# Patient Record
Sex: FEMALE | Race: BLACK OR AFRICAN AMERICAN | NOT HISPANIC OR LATINO | Employment: UNEMPLOYED | URBAN - METROPOLITAN AREA
[De-identification: names, ages, dates, MRNs, and addresses within clinical notes are randomized per-mention and may not be internally consistent; named-entity substitution may affect disease eponyms.]

---

## 2017-03-22 ENCOUNTER — OFFICE VISIT (OUTPATIENT)
Dept: SURGERY | Facility: CLINIC | Age: 47
End: 2017-03-22

## 2017-03-22 ENCOUNTER — TELEPHONE (OUTPATIENT)
Dept: SURGERY | Facility: CLINIC | Age: 47
End: 2017-03-22

## 2017-03-22 ENCOUNTER — APPOINTMENT (OUTPATIENT)
Dept: LAB | Facility: HOSPITAL | Age: 47
End: 2017-03-22
Attending: SURGERY

## 2017-03-22 VITALS
SYSTOLIC BLOOD PRESSURE: 158 MMHG | BODY MASS INDEX: 50.85 KG/M2 | DIASTOLIC BLOOD PRESSURE: 98 MMHG | RESPIRATION RATE: 16 BRPM | HEIGHT: 60 IN | WEIGHT: 259 LBS | TEMPERATURE: 98.3 F | HEART RATE: 97 BPM | OXYGEN SATURATION: 98 %

## 2017-03-22 DIAGNOSIS — R73.03 PREDIABETES: Primary | ICD-10-CM

## 2017-03-22 DIAGNOSIS — Z83.71 FAMILY HISTORY OF POLYPS IN THE COLON: ICD-10-CM

## 2017-03-22 DIAGNOSIS — Z12.11 COLON CANCER SCREENING: ICD-10-CM

## 2017-03-22 DIAGNOSIS — Z80.0 FAMILY HISTORY OF COLON CANCER REQUIRING SCREENING COLONOSCOPY: ICD-10-CM

## 2017-03-22 PROBLEM — M72.2 PLANTAR FASCIITIS: Status: ACTIVE | Noted: 2017-03-22

## 2017-03-22 LAB — HBA1C MFR BLD: 5.3 % (ref 4.8–5.6)

## 2017-03-22 PROCEDURE — 36415 COLL VENOUS BLD VENIPUNCTURE: CPT | Performed by: SURGERY

## 2017-03-22 PROCEDURE — 83036 HEMOGLOBIN GLYCOSYLATED A1C: CPT | Performed by: SURGERY

## 2017-03-22 PROCEDURE — 99203 OFFICE O/P NEW LOW 30 MIN: CPT | Performed by: SURGERY

## 2017-03-22 RX ORDER — BUTALBITAL, ACETAMINOPHEN AND CAFFEINE 300; 40; 50 MG/1; MG/1; MG/1
CAPSULE ORAL
Refills: 0 | COMMUNITY
Start: 2017-03-15

## 2017-03-22 RX ORDER — TIZANIDINE 4 MG/1
TABLET ORAL
Refills: 5 | COMMUNITY
Start: 2017-02-26 | End: 2017-04-28 | Stop reason: HOSPADM

## 2017-03-22 RX ORDER — SERTRALINE HYDROCHLORIDE 100 MG/1
TABLET, FILM COATED ORAL
Refills: 2 | COMMUNITY
Start: 2017-03-15

## 2017-03-22 NOTE — TELEPHONE ENCOUNTER
Called and spoke with patient regarding her hemoglobin A1c results.  I also advised her that she needed to see her primary care physician ASAP for preoperative medical clearance.

## 2017-03-22 NOTE — PROGRESS NOTES
PATIENT INFORMATION  Alyssa Umana   - 1970    CHIEF COMPLAINT  Chief Complaint   Patient presents with   • Hernia   HERNIA, PT C/O LLQ ABD PAIN, CT DONE AT [facility]  Referral from Dr. Soliman    HISTORY OF PRESENT ILLNESS  HPI  Patient is a 46-year-old -American female BMI 50.6 who was referred for evaluation for left lower quadrant/left sided abdominal pain and hernia.  She reports in 2015 she underwent excision of a large peritoneal cyst by GYN/ ONC at [facility].  The surgery was complicated with what appears to be a wound infection which from what the patient is describing was initially managed with negative pressure wound therapy i.e wound VAC and then switched to wet-to-dry dressings using Dakin's solution.  Patient reports she also saw another surgeon at the wound care center who recommended she continue with the wet-to-dry dressings.  She reports she underwent multiple incision and debridement as well as percutaneous drainage ×2 off what appears to be subcutaneous infected tissue postoperatively.  She reports it occurs 6 months to heal this wound.  She has since last month developed left-sided and left lower quadrant abdominal pain.  She was seen by Dr. Soliman was her primary care physician at the [facility] urgent care and a non-contrasted CT scan was done which showed a large left-sided abdominal wall hernia containing loops of small bowel without any evidence of strangulation/complications.  She has had nausea but denies any vomiting, change in bowel movements melena or hematochezia.  She has had recent weight gain for about 30 pounds.  She attributes that to some diet changes and lack of exercise due to the hernia.  She has a very strong family history of colon cancer and colon polyps.  Her father and brother both had colon cancer and multiple family members have had colon polyps.  She reports her last colonoscopy was in  by Dr. Farley and she thinks she had polyps  but is not completely certain.  She reports she was told to have a repeat colonoscopy in 5 years and is due for it at this time.  Her gastroenterologist has retired.    She also has a strong family history in the females of her family i.e. mother and sister.  Last mammogram 2016-BI-RADS Category 1    In her medical history diabetes mellitus as stated.  When I asked the patient if she had it she reports it was during a hospitalization for pneumonia and sepsis that her blood sugars were elevated.  As far as she is concerned she is not aware of that diagnosis and is not on any hypoglycemic therapy.    REVIEW OF SYSTEMS  Review of Systems   Constitutional: Positive for activity change, appetite change and unexpected weight change.   Respiratory: Negative.    Cardiovascular: Negative.    Gastrointestinal: Positive for abdominal pain and nausea.   Genitourinary: Negative.    Musculoskeletal: Positive for back pain.   Neurological: Negative.    Hematological: Negative.    Psychiatric/Behavioral: Negative.          ACTIVE PROBLEMS  Patient Active Problem List    Diagnosis   • Plantar fasciitis [M72.2]   • Thoracic back pain [M54.6]   • Hypertension [I10]   • Cutaneous fungal infection [B36.9]   • Adiposity [E66.9]         PAST MEDICAL HISTORY  Past Medical History:   Diagnosis Date   • Diabetes mellitus     MEDICALLY INDUCED   • Family history of breast cancer in mother     age 68   • Family history of breast cancer in sister     age 40   • Family history of colon cancer     Father age 69, Brother  age 62   • Hypertension     W PNEUMONIA   • Migraine    • Pneumonia    • Surgical menopause 2015         SURGICAL HISTORY  Past Surgical History:   Procedure Laterality Date   • BILATERAL SALPINGO OOPHORECTOMY  2015    Dr Fuentes Beebe   •  SECTION     •  SECTION PRIMARY     • CHOLECYSTECTOMY     • COLONOSCOPY      Dr. Farley   • DILATATION AND CURETTAGE      Dr Boles  "for bleeding   • EXPLORATORY LAPAROTOMY  04/14/2015    BSO and removal of 12 cm peritoneal inclusion cyst with clear fluid Dr Selwyn Beebe [facility]   • TOTAL ABDOMINAL HYSTERECTOMY  2004    fibroids,anemia, post op wound infection ([place])         FAMILY HISTORY  Family History   Problem Relation Age of Onset   • Hypertension Mother    • Breast cancer Mother 68   • Colon cancer Father 69   • Breast cancer Sister 40   • Colon cancer Brother 62   • No Known Problems Son    • No Known Problems Daughter          SOCIAL HISTORY  Social History     Occupational History   •  [employer]     Social History Main Topics   • Smoking status: Never Smoker   • Smokeless tobacco: Never Used   • Alcohol use Yes      Comment: rarely   • Drug use: No   • Sexual activity: Yes     Partners: Male     Birth control/ protection: None         CURRENT MEDICATIONS    Current Outpatient Prescriptions:   •  ALPRAZolam (XANAX) 0.5 MG tablet, TAKE ONE TABLET THREE TIMES DAILY, Disp: , Rfl: 0  •  amitriptyline (ELAVIL) 50 MG tablet, 1 TABLET BY MOUTH AT BEDTIME FOR SLEEP, Disp: , Rfl: 2  •  butalbital-acetaminophen-caffeine (ORBIVAN) -40 MG capsule capsule, TAKE ONE CAPSULE BY MOUTH EVERY 4 TO 6 HOURS AS NEEDED MAX OF 6 IN 24 HOURS, Disp: , Rfl: 0  •  ibuprofen (ADVIL,MOTRIN) 800 MG tablet, , Disp: , Rfl:   •  oxyCODONE (ROXICODONE) 15 MG immediate release tablet, TAKE 1 TABLET BY MOUTH THREE TIMES A DAY., Disp: , Rfl: 0  •  sertraline (ZOLOFT) 100 MG tablet, TAKE 1 TABLET BY MOUTH IN THE MORNING, Disp: , Rfl: 2  •  tiZANidine (ZANAFLEX) 4 MG tablet, TAKE 1 TABLET BY MOUTH EVERY EIGHT HOURS AS NEEDED FOR MUSCLE SPASM., Disp: , Rfl: 5    ALLERGIES  Penicillins and Naproxen    VITALS  Vitals:    03/22/17 0957   BP: 158/98   Pulse: 97   Resp: 16   Temp: 98.3 °F (36.8 °C)   SpO2: 98%   Weight: 259 lb (117 kg)   Height: 60\" (152.4 cm)       LAST RESULTS   Procedure visit on 09/23/2016   Component Date Value Ref Range Status   • " Color 09/23/2016 Yellow  Yellow, Straw, Dark Yellow, Amanda Final   • Clarity, UA 09/23/2016 Clear  Clear Final   • Glucose, UA 09/23/2016 Negative  Negative mg/dL Final   • Bilirubin 09/23/2016 Negative  Negative Final   • Ketones, UA 09/23/2016 Trace* Negative Final   • Specific Gravity  09/23/2016 1.025  1.005 - 1.030 Final   • Blood, UA 09/23/2016 Trace* Negative Final   • pH, Urine 09/23/2016 6.5  5.0 - 8.0 Final   • Protein, POC 09/23/2016 Negative  Negative mg/dL Final   • Urobilinogen, UA 09/23/2016 Normal  Normal Final   • Leukocytes 09/23/2016 Negative  Negative Final   • Nitrite, UA 09/23/2016 Negative  Negative Final   • HCG, Urine, QL 09/23/2016 Negative  Negative Final   • Lot Number 09/23/2016 [id number]   Final   • Internal Positive Control 09/23/2016 Positive   Final   • Internal Negative Control 09/23/2016 Negative   Final     No results found.    PHYSICAL EXAM  Physical Exam   Constitutional: She is oriented to person, place, and time. She appears well-developed and well-nourished.   She is very anxious    HENT:   Head: Normocephalic and atraumatic.   Eyes: EOM are normal. Pupils are equal, round, and reactive to light. No scleral icterus.   Neck: Neck supple.   Cardiovascular: Normal rate, regular rhythm and normal heart sounds.    Pulmonary/Chest: Breath sounds normal.   Abdominal:   Soft, obese, very large pannus.  There is a well-healed lower midline laparotomy wound.  To the left of this scar is a reducible large ventral/incisional hernia.  It is nontender to palpation.   Lymphadenopathy:     She has no cervical adenopathy.   Neurological: She is alert and oriented to person, place, and time.   Skin: Skin is warm and dry.   Psychiatric: She has a normal mood and affect. Her behavior is normal.   Nursing note and vitals reviewed.      ASSESSMENT  Incisional/ventral hernia  Abdominal pain  Family history of colon cancer and polyps  Family history of breast cancer    I have reviewed her CAT scan  from [facility] urgent care.  I have discussed with her in detail that I feel that it to the in her best interest that we proceed with a component separation.  The procedure, risks, benefits, complications have all been discussed with her in detail.    She is also due for a screening colonoscopy.  I have advised her that we need to proceed with screening colonoscopy prior to hernia repair.  Colonoscopy procedure risks complications including but not limited to risk of bleeding, infection, perforation requiring emergent procedures, post-polypectomy syndrome/bleeding as well as cardiopulmonary complications were thoroughly discussed with the patient who understood and gave verbal informed consent.  We'll schedule screening colonoscopy at [facility].  Bowel prep instructions were provided to her.  She will have to stay off all blood thinners including NSAIDs for at least 5-7 days prior to the procedure.  In the meantime I will obtain records from her previous surgery done in 2015 at [facility] and we'll also try and obtain records of her last colonoscopy.    She will need medical clearance prior to her surgery.  This will be arranged through my office.    We'll check a hemoglobin A1c.  Orders were provided to the patient.      PLAN  Schedule screening colonoscopy  Follow-up after procedure.  Patient was advised to call the office sooner should she have worsening symptoms or go to the nearest emergency room.

## 2017-03-22 NOTE — H&P
PATIENT INFORMATION  Alyssa Umana   - 1970    CHIEF COMPLAINT  Chief Complaint   Patient presents with   • Hernia   HERNIA, PT C/O LLQ ABD PAIN, CT DONE AT [facility]  Referral from Dr. Soliman    HISTORY OF PRESENT ILLNESS  HPI  Patient is a 46-year-old -American female BMI 50.6 who was referred for evaluation for left lower quadrant/left sided abdominal pain and hernia.  She reports in 2015 she underwent excision of a large peritoneal cyst by GYN/ ONC at [facility].  The surgery was complicated with what appears to be a wound infection which from what the patient is describing was initially managed with negative pressure wound therapy i.e wound VAC and then switched to wet-to-dry dressings using Dakin's solution.  Patient reports she also saw another surgeon at the wound care center who recommended she continue with the wet-to-dry dressings.  She reports she underwent multiple incision and debridement as well as percutaneous drainage ×2 off what appears to be subcutaneous infected tissue postoperatively.  She reports it occurs 6 months to heal this wound.  She has since last month developed left-sided and left lower quadrant abdominal pain.  She was seen by Dr. Soliman was her primary care physician at the [facility] urgent [facility] and a non-contrasted CT scan was done which showed a large left-sided abdominal wall hernia containing loops of small bowel without any evidence of strangulation/complications.  She has had nausea but denies any vomiting, change in bowel movements melena or hematochezia.  She has had recent weight gain for about 30 pounds.  She attributes that to some diet changes and lack of exercise due to the hernia.  She has a very strong family history of colon cancer and colon polyps.  Her father and brother both had colon cancer and multiple family members have had colon polyps.  She reports her last colonoscopy was in  by Dr. Farley and she thinks she had polyps  but is not completely certain.  She reports she was told to have a repeat colonoscopy in 5 years and is due for it at this time.  Her gastroenterologist has retired.    She also has a strong family history in the females of her family i.e. mother and sister.  Last mammogram 2016-BI-RADS Category 1    In her medical history diabetes mellitus as stated.  When I asked the patient if she had it she reports it was during a hospitalization for pneumonia and sepsis that her blood sugars were elevated.  As far as she is concerned she is not aware of that diagnosis and is not on any hypoglycemic therapy.    REVIEW OF SYSTEMS  Review of Systems   Constitutional: Positive for activity change, appetite change and unexpected weight change.   Respiratory: Negative.    Cardiovascular: Negative.    Gastrointestinal: Positive for abdominal pain and nausea.   Genitourinary: Negative.    Musculoskeletal: Positive for back pain.   Neurological: Negative.    Hematological: Negative.    Psychiatric/Behavioral: Negative.          ACTIVE PROBLEMS  Patient Active Problem List    Diagnosis   • Plantar fasciitis [M72.2]   • Thoracic back pain [M54.6]   • Hypertension [I10]   • Cutaneous fungal infection [B36.9]   • Adiposity [E66.9]         PAST MEDICAL HISTORY  Past Medical History:   Diagnosis Date   • Diabetes mellitus     MEDICALLY INDUCED   • Family history of breast cancer in mother     age 68   • Family history of breast cancer in sister     age 40   • Family history of colon cancer     Father age 69, Brother  age 62   • Hypertension     W PNEUMONIA   • Migraine    • Pneumonia    • Surgical menopause 2015         SURGICAL HISTORY  Past Surgical History:   Procedure Laterality Date   • BILATERAL SALPINGO OOPHORECTOMY  2015    Dr Fuentes Beebe   •  SECTION     •  SECTION PRIMARY     • CHOLECYSTECTOMY     • COLONOSCOPY      Dr. Farley   • DILATATION AND CURETTAGE      Dr Boles  "for bleeding   • EXPLORATORY LAPAROTOMY  04/14/2015    BSO and removal of 12 cm peritoneal inclusion cyst with clear fluid Dr Selwyn Beebe [facility]   • TOTAL ABDOMINAL HYSTERECTOMY  2004    fibroids,anemia, post op wound infection ([place])         FAMILY HISTORY  Family History   Problem Relation Age of Onset   • Hypertension Mother    • Breast cancer Mother 68   • Colon cancer Father 69   • Breast cancer Sister 40   • Colon cancer Brother 62   • No Known Problems Son    • No Known Problems Daughter          SOCIAL HISTORY  Social History     Occupational History   •  [employer]     Social History Main Topics   • Smoking status: Never Smoker   • Smokeless tobacco: Never Used   • Alcohol use Yes      Comment: rarely   • Drug use: No   • Sexual activity: Yes     Partners: Male     Birth control/ protection: None         CURRENT MEDICATIONS    Current Outpatient Prescriptions:   •  ALPRAZolam (XANAX) 0.5 MG tablet, TAKE ONE TABLET THREE TIMES DAILY, Disp: , Rfl: 0  •  amitriptyline (ELAVIL) 50 MG tablet, 1 TABLET BY MOUTH AT BEDTIME FOR SLEEP, Disp: , Rfl: 2  •  butalbital-acetaminophen-caffeine (ORBIVAN) -40 MG capsule capsule, TAKE ONE CAPSULE BY MOUTH EVERY 4 TO 6 HOURS AS NEEDED MAX OF 6 IN 24 HOURS, Disp: , Rfl: 0  •  ibuprofen (ADVIL,MOTRIN) 800 MG tablet, , Disp: , Rfl:   •  oxyCODONE (ROXICODONE) 15 MG immediate release tablet, TAKE 1 TABLET BY MOUTH THREE TIMES A DAY., Disp: , Rfl: 0  •  sertraline (ZOLOFT) 100 MG tablet, TAKE 1 TABLET BY MOUTH IN THE MORNING, Disp: , Rfl: 2  •  tiZANidine (ZANAFLEX) 4 MG tablet, TAKE 1 TABLET BY MOUTH EVERY EIGHT HOURS AS NEEDED FOR MUSCLE SPASM., Disp: , Rfl: 5    ALLERGIES  Penicillins and Naproxen    VITALS  Vitals:    03/22/17 0957   BP: 158/98   Pulse: 97   Resp: 16   Temp: 98.3 °F (36.8 °C)   SpO2: 98%   Weight: 259 lb (117 kg)   Height: 60\" (152.4 cm)       LAST RESULTS   Procedure visit on 09/23/2016   Component Date Value Ref Range Status   • " Color 09/23/2016 Yellow  Yellow, Straw, Dark Yellow, Amanda Final   • Clarity, UA 09/23/2016 Clear  Clear Final   • Glucose, UA 09/23/2016 Negative  Negative mg/dL Final   • Bilirubin 09/23/2016 Negative  Negative Final   • Ketones, UA 09/23/2016 Trace* Negative Final   • Specific Gravity  09/23/2016 1.025  1.005 - 1.030 Final   • Blood, UA 09/23/2016 Trace* Negative Final   • pH, Urine 09/23/2016 6.5  5.0 - 8.0 Final   • Protein, POC 09/23/2016 Negative  Negative mg/dL Final   • Urobilinogen, UA 09/23/2016 Normal  Normal Final   • Leukocytes 09/23/2016 Negative  Negative Final   • Nitrite, UA 09/23/2016 Negative  Negative Final   • HCG, Urine, QL 09/23/2016 Negative  Negative Final   • Lot Number 09/23/2016 [id number]   Final   • Internal Positive Control 09/23/2016 Positive   Final   • Internal Negative Control 09/23/2016 Negative   Final     No results found.    PHYSICAL EXAM  Physical Exam   Constitutional: She is oriented to person, place, and time. She appears well-developed and well-nourished.   She is very anxious    HENT:   Head: Normocephalic and atraumatic.   Eyes: EOM are normal. Pupils are equal, round, and reactive to light. No scleral icterus.   Neck: Neck supple.   Cardiovascular: Normal rate, regular rhythm and normal heart sounds.    Pulmonary/Chest: Breath sounds normal.   Abdominal:   Soft, obese, very large pannus.  There is a well-healed lower midline laparotomy wound.  To the left of this scar is a reducible large ventral/incisional hernia.  It is nontender to palpation.   Lymphadenopathy:     She has no cervical adenopathy.   Neurological: She is alert and oriented to person, place, and time.   Skin: Skin is warm and dry.   Psychiatric: She has a normal mood and affect. Her behavior is normal.   Nursing note and vitals reviewed.      ASSESSMENT  Incisional/ventral hernia  Abdominal pain  Family history of colon cancer and polyps  Family history of breast cancer    I have reviewed her CAT scan  from [facility] urgent care.  I have discussed with her in detail that I feel that it to the in her best interest that we proceed with a component separation.  The procedure, risks, benefits, complications have all been discussed with her in detail.    She is also due for a screening colonoscopy.  I have advised her that we need to proceed with screening colonoscopy prior to hernia repair.  Colonoscopy procedure risks complications including but not limited to risk of bleeding, infection, perforation requiring emergent procedures, post-polypectomy syndrome/bleeding as well as cardiopulmonary complications were thoroughly discussed with the patient who understood and gave verbal informed consent.  We'll schedule screening colonoscopy at [facility].  Bowel prep instructions were provided to her.  She will have to stay off all blood thinners including NSAIDs for at least 5-7 days prior to the procedure.  In the meantime I will obtain records from her previous surgery done in 2015 at [facility] and we'll also try and obtain records of her last colonoscopy.    She will need medical clearance prior to her surgery.  This will be arranged through my office.    We'll check a hemoglobin A1c.  Orders were provided to the patient.      PLAN  Schedule screening colonoscopy  Follow-up after procedure.  Patient was advised to call the office sooner should she have worsening symptoms or go to the nearest emergency room.

## 2017-04-10 ENCOUNTER — ANESTHESIA EVENT (OUTPATIENT)
Dept: PERIOP | Facility: HOSPITAL | Age: 47
End: 2017-04-10

## 2017-04-11 ENCOUNTER — ANESTHESIA (OUTPATIENT)
Dept: PERIOP | Facility: HOSPITAL | Age: 47
End: 2017-04-11

## 2017-04-11 ENCOUNTER — HOSPITAL ENCOUNTER (OUTPATIENT)
Facility: HOSPITAL | Age: 47
Setting detail: HOSPITAL OUTPATIENT SURGERY
Discharge: HOME | End: 2017-04-11
Attending: SURGERY | Admitting: SURGERY

## 2017-04-11 VITALS
BODY MASS INDEX: 48.71 KG/M2 | DIASTOLIC BLOOD PRESSURE: 103 MMHG | TEMPERATURE: 97.8 F | SYSTOLIC BLOOD PRESSURE: 154 MMHG | RESPIRATION RATE: 12 BRPM | WEIGHT: 249.4 LBS | OXYGEN SATURATION: 98 % | HEART RATE: 68 BPM

## 2017-04-11 PROBLEM — Z80.0 FAMILY HISTORY OF COLON CANCER REQUIRING SCREENING COLONOSCOPY: Status: ACTIVE | Noted: 2017-04-11

## 2017-04-11 PROBLEM — Z12.11 COLON CANCER SCREENING: Status: ACTIVE | Noted: 2017-04-11

## 2017-04-11 PROBLEM — Z83.71 FAMILY HISTORY OF POLYPS IN THE COLON: Status: ACTIVE | Noted: 2017-04-11

## 2017-04-11 LAB — GLUCOSE BLDC GLUCOMTR-MCNC: 91 MG/DL (ref 70–130)

## 2017-04-11 PROCEDURE — 82962 GLUCOSE BLOOD TEST: CPT

## 2017-04-11 PROCEDURE — 25010000002 PROPOFOL 10 MG/ML EMULSION: Performed by: NURSE ANESTHETIST, CERTIFIED REGISTERED

## 2017-04-11 PROCEDURE — 45378 DIAGNOSTIC COLONOSCOPY: CPT | Performed by: SURGERY

## 2017-04-11 RX ORDER — LIDOCAINE HYDROCHLORIDE 20 MG/ML
INJECTION, SOLUTION INFILTRATION; PERINEURAL AS NEEDED
Status: DISCONTINUED | OUTPATIENT
Start: 2017-04-11 | End: 2017-04-11 | Stop reason: SURG

## 2017-04-11 RX ORDER — PROPOFOL 10 MG/ML
VIAL (ML) INTRAVENOUS AS NEEDED
Status: DISCONTINUED | OUTPATIENT
Start: 2017-04-11 | End: 2017-04-11 | Stop reason: SURG

## 2017-04-11 RX ORDER — SODIUM CHLORIDE 0.9 % (FLUSH) 0.9 %
1-10 SYRINGE (ML) INJECTION AS NEEDED
Status: DISCONTINUED | OUTPATIENT
Start: 2017-04-11 | End: 2017-04-11 | Stop reason: HOSPADM

## 2017-04-11 RX ORDER — MAGNESIUM HYDROXIDE 1200 MG/15ML
LIQUID ORAL AS NEEDED
Status: DISCONTINUED | OUTPATIENT
Start: 2017-04-11 | End: 2017-04-11 | Stop reason: HOSPADM

## 2017-04-11 RX ORDER — GLYCOPYRROLATE 0.2 MG/ML
INJECTION INTRAMUSCULAR; INTRAVENOUS AS NEEDED
Status: DISCONTINUED | OUTPATIENT
Start: 2017-04-11 | End: 2017-04-11 | Stop reason: SURG

## 2017-04-11 RX ORDER — SODIUM CHLORIDE, SODIUM LACTATE, POTASSIUM CHLORIDE, CALCIUM CHLORIDE 600; 310; 30; 20 MG/100ML; MG/100ML; MG/100ML; MG/100ML
9 INJECTION, SOLUTION INTRAVENOUS CONTINUOUS
Status: DISCONTINUED | OUTPATIENT
Start: 2017-04-11 | End: 2017-04-11 | Stop reason: HOSPADM

## 2017-04-11 RX ORDER — PROPOFOL 10 MG/ML
VIAL (ML) INTRAVENOUS CONTINUOUS PRN
Status: DISCONTINUED | OUTPATIENT
Start: 2017-04-11 | End: 2017-04-11 | Stop reason: SURG

## 2017-04-11 RX ORDER — LIDOCAINE HYDROCHLORIDE 10 MG/ML
INJECTION, SOLUTION EPIDURAL; INFILTRATION; INTRACAUDAL; PERINEURAL
Status: DISCONTINUED
Start: 2017-04-11 | End: 2017-04-11 | Stop reason: HOSPADM

## 2017-04-11 RX ORDER — LIDOCAINE HYDROCHLORIDE 10 MG/ML
0.3 INJECTION, SOLUTION EPIDURAL; INFILTRATION; INTRACAUDAL; PERINEURAL ONCE
Status: DISCONTINUED | OUTPATIENT
Start: 2017-04-11 | End: 2017-04-11 | Stop reason: HOSPADM

## 2017-04-11 RX ADMIN — PROPOFOL 50 MG: 10 INJECTION, EMULSION INTRAVENOUS at 09:07

## 2017-04-11 RX ADMIN — LIDOCAINE HYDROCHLORIDE 100 MG: 20 INJECTION, SOLUTION INFILTRATION; PERINEURAL at 09:04

## 2017-04-11 RX ADMIN — SODIUM CHLORIDE, POTASSIUM CHLORIDE, SODIUM LACTATE AND CALCIUM CHLORIDE 9 ML/HR: 600; 310; 30; 20 INJECTION, SOLUTION INTRAVENOUS at 08:30

## 2017-04-11 RX ADMIN — PROPOFOL 50 MG: 10 INJECTION, EMULSION INTRAVENOUS at 09:04

## 2017-04-11 RX ADMIN — GLYCOPYRROLATE 0.1 MG: 0.2 INJECTION INTRAMUSCULAR; INTRAVENOUS at 09:00

## 2017-04-11 RX ADMIN — PROPOFOL 100 MCG/KG/MIN: 10 INJECTION, EMULSION INTRAVENOUS at 09:04

## 2017-04-11 NOTE — PLAN OF CARE
Problem: Patient Care Overview (Adult)  Goal: Plan of Care Review  Outcome: Ongoing (interventions implemented as appropriate)    04/11/17 1000   Coping/Psychosocial Response Interventions   Plan Of Care Reviewed With patient;daughter   Patient Care Overview   Progress no change   Outcome Evaluation   Outcome Summary/Follow up Plan VSS, awaiting discharge       Goal: Adult Individualization and Mutuality  Outcome: Ongoing (interventions implemented as appropriate)    04/11/17 1315   Mutuality/Individual Preferences   What Anxieties, Fears or Concerns Do You Have About Your Health or Care? Denies         Problem: GI Endoscopy (Adult)  Goal: Signs and Symptoms of Listed Potential Problems Will be Absent or Manageable (GI Endoscopy)  Outcome: Ongoing (interventions implemented as appropriate)    04/11/17 1000   GI Endoscopy   Problems Assessed (GI Endoscopy) all   Problems Present (GI Endoscopy) none

## 2017-04-11 NOTE — PLAN OF CARE
Problem: Patient Care Overview (Adult)  Goal: Plan of Care Review  Outcome: Ongoing (interventions implemented as appropriate)    04/11/17 0844   Coping/Psychosocial Response Interventions   Plan Of Care Reviewed With patient;daughter   Patient Care Overview   Progress no change   Outcome Evaluation   Outcome Summary/Follow up Plan VSS, NO C/O, READY FOR PROCEDURE       Goal: Adult Individualization and Mutuality  Outcome: Ongoing (interventions implemented as appropriate)    Problem: GI Endoscopy (Adult)  Goal: Signs and Symptoms of Listed Potential Problems Will be Absent or Manageable (GI Endoscopy)  Outcome: Ongoing (interventions implemented as appropriate)

## 2017-04-11 NOTE — H&P (VIEW-ONLY)
PATIENT INFORMATION  Alyssa Umana   - 1970    CHIEF COMPLAINT  Chief Complaint   Patient presents with   • Hernia   HERNIA, PT C/O LLQ ABD PAIN, CT DONE AT [facility] URG[facility]  Referral from Dr. Soliman    HISTORY OF PRESENT ILLNESS  HPI  Patient is a 46-year-old -American female BMI 50.6 who was referred for evaluation for left lower quadrant/left sided abdominal pain and hernia.  She reports in 2015 she underwent excision of a large peritoneal cyst by GYN/ ONC at [facility].  The surgery was complicated with what appears to be a wound infection which from what the patient is describing was initially managed with negative pressure wound therapy i.e wound VAC and then switched to wet-to-dry dressings using Dakin's solution.  Patient reports she also saw another surgeon at the wound care center who recommended she continue with the wet-to-dry dressings.  She reports she underwent multiple incision and debridement as well as percutaneous drainage ×2 off what appears to be subcutaneous infected tissue postoperatively.  She reports it occurs 6 months to heal this wound.  She has since last month developed left-sided and left lower quadrant abdominal pain.  She was seen by Dr. Soliman was her primary care physician at the [facility] urgent care and a non-contrasted CT scan was done which showed a large left-sided abdominal wall hernia containing loops of small bowel without any evidence of strangulation/complications.  She has had nausea but denies any vomiting, change in bowel movements melena or hematochezia.  She has had recent weight gain for about 30 pounds.  She attributes that to some diet changes and lack of exercise due to the hernia.  She has a very strong family history of colon cancer and colon polyps.  Her father and brother both had colon cancer and multiple family members have had colon polyps.  She reports her last colonoscopy was in  by Dr. Farley and she thinks she had polyps  but is not completely certain.  She reports she was told to have a repeat colonoscopy in 5 years and is due for it at this time.  Her gastroenterologist has retired.    She also has a strong family history in the females of her family i.e. mother and sister.  Last mammogram 2016-BI-RADS Category 1    In her medical history diabetes mellitus as stated.  When I asked the patient if she had it she reports it was during a hospitalization for pneumonia and sepsis that her blood sugars were elevated.  As far as she is concerned she is not aware of that diagnosis and is not on any hypoglycemic therapy.    REVIEW OF SYSTEMS  Review of Systems   Constitutional: Positive for activity change, appetite change and unexpected weight change.   Respiratory: Negative.    Cardiovascular: Negative.    Gastrointestinal: Positive for abdominal pain and nausea.   Genitourinary: Negative.    Musculoskeletal: Positive for back pain.   Neurological: Negative.    Hematological: Negative.    Psychiatric/Behavioral: Negative.          ACTIVE PROBLEMS  Patient Active Problem List    Diagnosis   • Plantar fasciitis [M72.2]   • Thoracic back pain [M54.6]   • Hypertension [I10]   • Cutaneous fungal infection [B36.9]   • Adiposity [E66.9]         PAST MEDICAL HISTORY  Past Medical History:   Diagnosis Date   • Diabetes mellitus     MEDICALLY INDUCED   • Family history of breast cancer in mother     age 68   • Family history of breast cancer in sister     age 40   • Family history of colon cancer     Father age 69, Brother  age 62   • Hypertension     W PNEUMONIA   • Migraine    • Pneumonia    • Surgical menopause 2015         SURGICAL HISTORY  Past Surgical History:   Procedure Laterality Date   • BILATERAL SALPINGO OOPHORECTOMY  2015    Dr Fuentes Beebe   •  SECTION     •  SECTION PRIMARY     • CHOLECYSTECTOMY     • COLONOSCOPY      Dr. Farley   • DILATATION AND CURETTAGE      Dr Boles  "for bleeding   • EXPLORATORY LAPAROTOMY  04/14/2015    BSO and removal of 12 cm peritoneal inclusion cyst with clear fluid Dr Selwyn Beebe [facility]   • TOTAL ABDOMINAL HYSTERECTOMY  2004    fibroids,anemia, post op wound infection ([place])         FAMILY HISTORY  Family History   Problem Relation Age of Onset   • Hypertension Mother    • Breast cancer Mother 68   • Colon cancer Father 69   • Breast cancer Sister 40   • Colon cancer Brother 62   • No Known Problems Son    • No Known Problems Daughter          SOCIAL HISTORY  Social History     Occupational History   •  [employer]     Social History Main Topics   • Smoking status: Never Smoker   • Smokeless tobacco: Never Used   • Alcohol use Yes      Comment: rarely   • Drug use: No   • Sexual activity: Yes     Partners: Male     Birth control/ protection: None         CURRENT MEDICATIONS    Current Outpatient Prescriptions:   •  ALPRAZolam (XANAX) 0.5 MG tablet, TAKE ONE TABLET THREE TIMES DAILY, Disp: , Rfl: 0  •  amitriptyline (ELAVIL) 50 MG tablet, 1 TABLET BY MOUTH AT BEDTIME FOR SLEEP, Disp: , Rfl: 2  •  butalbital-acetaminophen-caffeine (ORBIVAN) -40 MG capsule capsule, TAKE ONE CAPSULE BY MOUTH EVERY 4 TO 6 HOURS AS NEEDED MAX OF 6 IN 24 HOURS, Disp: , Rfl: 0  •  ibuprofen (ADVIL,MOTRIN) 800 MG tablet, , Disp: , Rfl:   •  oxyCODONE (ROXICODONE) 15 MG immediate release tablet, TAKE 1 TABLET BY MOUTH THREE TIMES A DAY., Disp: , Rfl: 0  •  sertraline (ZOLOFT) 100 MG tablet, TAKE 1 TABLET BY MOUTH IN THE MORNING, Disp: , Rfl: 2  •  tiZANidine (ZANAFLEX) 4 MG tablet, TAKE 1 TABLET BY MOUTH EVERY EIGHT HOURS AS NEEDED FOR MUSCLE SPASM., Disp: , Rfl: 5    ALLERGIES  Penicillins and Naproxen    VITALS  Vitals:    03/22/17 0957   BP: 158/98   Pulse: 97   Resp: 16   Temp: 98.3 °F (36.8 °C)   SpO2: 98%   Weight: 259 lb (117 kg)   Height: 60\" (152.4 cm)       LAST RESULTS   Procedure visit on 09/23/2016   Component Date Value Ref Range Status   • " Color 09/23/2016 Yellow  Yellow, Straw, Dark Yellow, Amanda Final   • Clarity, UA 09/23/2016 Clear  Clear Final   • Glucose, UA 09/23/2016 Negative  Negative mg/dL Final   • Bilirubin 09/23/2016 Negative  Negative Final   • Ketones, UA 09/23/2016 Trace* Negative Final   • Specific Gravity  09/23/2016 1.025  1.005 - 1.030 Final   • Blood, UA 09/23/2016 Trace* Negative Final   • pH, Urine 09/23/2016 6.5  5.0 - 8.0 Final   • Protein, POC 09/23/2016 Negative  Negative mg/dL Final   • Urobilinogen, UA 09/23/2016 Normal  Normal Final   • Leukocytes 09/23/2016 Negative  Negative Final   • Nitrite, UA 09/23/2016 Negative  Negative Final   • HCG, Urine, QL 09/23/2016 Negative  Negative Final   • Lot Number 09/23/2016 [id number]   Final   • Internal Positive Control 09/23/2016 Positive   Final   • Internal Negative Control 09/23/2016 Negative   Final     No results found.    PHYSICAL EXAM  Physical Exam   Constitutional: She is oriented to person, place, and time. She appears well-developed and well-nourished.   She is very anxious    HENT:   Head: Normocephalic and atraumatic.   Eyes: EOM are normal. Pupils are equal, round, and reactive to light. No scleral icterus.   Neck: Neck supple.   Cardiovascular: Normal rate, regular rhythm and normal heart sounds.    Pulmonary/Chest: Breath sounds normal.   Abdominal:   Soft, obese, very large pannus.  There is a well-healed lower midline laparotomy wound.  To the left of this scar is a reducible large ventral/incisional hernia.  It is nontender to palpation.   Lymphadenopathy:     She has no cervical adenopathy.   Neurological: She is alert and oriented to person, place, and time.   Skin: Skin is warm and dry.   Psychiatric: She has a normal mood and affect. Her behavior is normal.   Nursing note and vitals reviewed.      ASSESSMENT  Incisional/ventral hernia  Abdominal pain  Family history of colon cancer and polyps  Family history of breast cancer    I have reviewed her CAT scan  from [facility] urgent care.  I have discussed with her in detail that I feel that it to the in her best interest that we proceed with a component separation.  The procedure, risks, benefits, complications have all been discussed with her in detail.    She is also due for a screening colonoscopy.  I have advised her that we need to proceed with screening colonoscopy prior to hernia repair.  Colonoscopy procedure risks complications including but not limited to risk of bleeding, infection, perforation requiring emergent procedures, post-polypectomy syndrome/bleeding as well as cardiopulmonary complications were thoroughly discussed with the patient who understood and gave verbal informed consent.  We'll schedule screening colonoscopy at [facility].  Bowel prep instructions were provided to her.  She will have to stay off all blood thinners including NSAIDs for at least 5-7 days prior to the procedure.  In the meantime I will obtain records from her previous surgery done in 2015 at [facility] and we'll also try and obtain records of her last colonoscopy.    She will need medical clearance prior to her surgery.  This will be arranged through my office.    We'll check a hemoglobin A1c.  Orders were provided to the patient.      PLAN  Schedule screening colonoscopy  Follow-up after procedure.  Patient was advised to call the office sooner should she have worsening symptoms or go to the nearest emergency room.

## 2017-04-11 NOTE — ANESTHESIA PREPROCEDURE EVALUATION
Anesthesia Evaluation     Patient summary reviewed and Nursing notes reviewed   no history of anesthetic complications:  NPO Status: > 8 hours   Airway   Mallampati: III  TM distance: >3 FB  Neck ROM: full  possible difficult intubation  Dental - normal exam     Pulmonary - normal exam    breath sounds clear to auscultation  (+) pneumonia resolved ,   Cardiovascular - normal exam    Rhythm: regular  Rate: normal    (+) hypertension poorly controlled,     ROS comment: EKG @ Dr Soliman office 4/10/17, ok per pt    Neuro/Psych  (+) headaches,    GI/Hepatic/Renal/Endo    (+) morbid obesity, diabetes mellitus (not Rx in 10 yrs. accu check 91) type 2,     Musculoskeletal     (+) chronic pain,   Abdominal   (+) obese,    Substance History - negative use     OB/GYN          Other   (+) arthritis                                 Anesthesia Plan    ASA 3     MAC     Anesthetic plan and risks discussed with patient.  Use of blood products discussed with patient  Consented to blood products.

## 2017-04-11 NOTE — ANESTHESIA POSTPROCEDURE EVALUATION
Patient: Alyssa Umana    Procedure Summary     Date Anesthesia Start Anesthesia Stop Room / Location    04/11/17 0859 0937 [facility] /  [facility]       Procedure Diagnosis Surgeon Provider    COLONOSCOPY (N/A ) Diverticulosis; Internal hemorrhoids  (Family history of polyps in the colon [Z83.71]; Colon cancer screening [Z12.11]; Family history of colon cancer requiring screening colonoscopy [Z80.0]) MD Michelle Rockwell CRNA          Anesthesia Type: MAC  Last vitals  BP (!) 160/103 (04/11/17 0941)    Temp 97.8 °F (36.6 °C) (04/11/17 0941)    Pulse 72 (04/11/17 0941)   Resp 10 (04/11/17 0941)    SpO2 100 % (04/11/17 0941)      Post Anesthesia Care and Evaluation    Patient location during evaluation: PHASE II  Patient participation: complete - patient participated  Level of consciousness: awake and alert  Pain score: 0  Pain management: adequate  Airway patency: patent  Anesthetic complications: No anesthetic complications  PONV Status: none  Cardiovascular status: acceptable  Respiratory status: acceptable  Hydration status: acceptable

## 2017-04-11 NOTE — OP NOTE
Colonoscopy Procedure Note  Date of procedure: 4/11/17    Pre-operative Diagnosis:  Family history of colon cancer requiring screening colonoscopy           Personal history of colon polyps    Post-operative Diagnosis: Inverted sigmoid colon diverticulum            Internal hemorrhoids            Poor prep    Procedure: Colonoscopy      Surgeon: Leslie Majano M.D.    Anesthetic: MAC per Maribell Noonan CRNA    EBL : None    Complications : None    Indications:  Patient is a 46-year-old -American morbidly obese female with BMI 50.6 referred to general surgery for abdominal pain and screening colonoscopy.  She has a family history of colon cancer and colon polyps. Her last colonoscopy was in 2012 she has a personal history of colon polyps.  She was told to have a follow-up for  surveillance/screening in 5 years.  We have been unable to get those records.  She has had left lower quadrant and left-sided abdominal pain. Denies any change in bowel movements melena or hematochezia.  She has had a recent weight gain of 30 pounds.  As part of workup she was advised to undergo screening colonoscopy.  Procedure, risks, benefits, complications including but not limited to risk of bleeding, perforation requiring emergent additional procedures, post-polypectomy syndrome/bleeding as well as cardiopulmonary complications were thoroughly discussed with the patient understood and gave informed consent.    Findings/Treatments: Colon preparation was poor specially in the right colon with there was a lot of thick liquid and solid stool that had to be irrigated and suctioned out at several instances.  This was certainly increase the likelihood of missing a diminutive lesion.  To the best visualization and no gross mucosal abnormalities or polyps noted.  A solitary inverted diverticulum noted in the sigmoid colon with no associated complications.  Large internal hemorrhoids with no associated complications.    Scope Withdrawal Time:   Greater than 6 minutes      Recommendations: Educational materials on high fiber diet and diverticulosis provided.  Based on her personal history of colon polyps, poor colon preparation and family history of colon cancer I would recommend a repeat colonoscopy in one year with a 2 day prep.    Procedure Details     After discussing the benefits and risks of the procedure with the patient, not limited to but including:  Bleeding, infection, perforation, aspiration; informed consent was signed.  The patient was taken into the endoscopy room at [facility] and placed in the left lateral decubitus position.  MAC anesthesia was given with appropriate cardiopulmonary monitoring.  A rectal exam was performed.  Sphincter tone was normal.  The colonoscope was then inserted and carefully advanced to the cecum while visualizing the mucosa.  The colon preparation was very poor.  There was thick liquid and solid stool in the right colon particularly.  I had to irrigate and suction out this could certainly increase the likelihood of missing a diminutive lesion.  The cecum was identified by the anatomic landmarks i.e. the cecal strap, ileocecal valve and the orifice of the appendix.  Scope was then gradually withdrawn carefully evaluating the colon mucosa in a circumferential fashion with findings as follows: Cecum, ileocecal valve, appendiceal orifice, ascending colon, hepatic flexure, transverse colon, splenic flexure, descending colon no gross pathology noted but visualization was suboptimal due to poor prep.  Scope was brought back into the sigmoid colon where diverticulosis was noted along with an inverted diverticulum.  No associated complications.  Scope was brought back into the rectum.  Retroflex examination performed small internal hemorrhoids with no associated complications.  Scope was then straightened out and withdrawn while desufflating the colon.    Patient was awakened, her anesthesia reversed and she  was taken to recovery room in stable condition having tolerated her procedure well with no immediate apparent complications.      Leslie Majano MD

## 2017-04-17 ENCOUNTER — OFFICE VISIT (OUTPATIENT)
Dept: SURGERY | Facility: CLINIC | Age: 47
End: 2017-04-17

## 2017-04-17 VITALS
OXYGEN SATURATION: 99 % | BODY MASS INDEX: 48.88 KG/M2 | HEART RATE: 96 BPM | DIASTOLIC BLOOD PRESSURE: 100 MMHG | SYSTOLIC BLOOD PRESSURE: 154 MMHG | HEIGHT: 60 IN | WEIGHT: 249 LBS | TEMPERATURE: 98.2 F

## 2017-04-17 DIAGNOSIS — K43.2 INCISIONAL HERNIA, WITHOUT OBSTRUCTION OR GANGRENE: Primary | ICD-10-CM

## 2017-04-17 DIAGNOSIS — K43.9 VENTRAL HERNIA WITHOUT OBSTRUCTION OR GANGRENE: ICD-10-CM

## 2017-04-17 PROCEDURE — 99213 OFFICE O/P EST LOW 20 MIN: CPT | Performed by: SURGERY

## 2017-04-17 RX ORDER — CLINDAMYCIN PHOSPHATE 900 MG/50ML
900 INJECTION INTRAVENOUS ONCE
Status: CANCELLED | OUTPATIENT
Start: 2017-04-17 | End: 2017-04-17

## 2017-04-17 NOTE — H&P
PATIENT INFORMATION  Alyssa Umana   - 1970    CHIEF COMPLAINT  Chief Complaint   Patient presents with   • Hernia   Follow-up after colonoscopy  Here to discuss hernia surgery    HISTORY OF PRESENT ILLNESS  HPI  Patient presents the office today for follow-up to discuss her colonoscopy results and discuss her future hernia surgery.  She reports she is overall doing well.  Denies any nausea vomiting change in bowel movements melena or hematochezia.  She does complain of pain associated with her left lower quadrant abdominal wall hernia.  She does not notice any further exacerbation of her existent pain.    Colonoscopy findings were discussed with the patient.  In light of her poor prep and strong family history of colon cancer I have advised her that we need to have repeat colonoscopy done in 1 year with a 2 day prep.  She is agreeable to that.    As far as the left lower quadrant abdominal wall hernia is concerned --I have reviewed the images personally and discussed the case as well as her CT scan with my associate Dr. Cordova.  She has a large left sided abdominal wall incisional hernia, with a loop of bowel contained within the hernia sac within a complicating features i.e. bowel obstruction or strangulation.    As far as past surgical history is concerned-She reports in 2015 she underwent excision of a large peritoneal cyst by GYN/ ONC at [facility]. The surgery was complicated with what appears to be a wound infection which from what the patient is describing was initially managed with negative pressure wound therapy i.e wound VAC and then switched to wet-to-dry dressings using Dakin's solution. Patient reports she also saw another surgeon at the wound care center who recommended she continue with the wet-to-dry dressings. She reports she underwent multiple incision and debridement as well as percutaneous drainage ×2 off what appears to be subcutaneous infected tissue  postoperatively. She reports it occurs 6 months to heal this wound.  I do not have any records available as to what organisms were cultured from her wound infections.  Will contact [facility] to obtain those records.        REVIEW OF SYSTEMS  Review of Systems   Constitutional: Positive for activity change and unexpected weight change.   Respiratory: Negative.    Cardiovascular: Negative.    Gastrointestinal: Positive for abdominal pain.   Psychiatric/Behavioral: The patient is nervous/anxious.          ACTIVE PROBLEMS  Patient Active Problem List    Diagnosis   • Colon cancer screening [Z12.11]   • Family history of colon cancer requiring screening colonoscopy [Z80.0]   • Family history of polyps in the colon [Z83.71]   • Plantar fasciitis [M72.2]   • Thoracic back pain [M54.6]   • Hypertension [I10]   • Cutaneous fungal infection [B36.9]   • Adiposity [E66.9]         PAST MEDICAL HISTORY  Past Medical History:   Diagnosis Date   • Diabetes mellitus     MEDICALLY INDUCED   • Family history of breast cancer in mother     age 68   • Family history of breast cancer in sister     age 40   • Family history of colon cancer     Father age 69, Brother  age 62   • Hypertension     W PNEUMONIA   • Migraine    • Pneumonia    • Surgical menopause 2015         SURGICAL HISTORY  Past Surgical History:   Procedure Laterality Date   • BILATERAL SALPINGO OOPHORECTOMY  2015    Dr Fuentes Beebe   •  SECTION     •  SECTION PRIMARY     • CHOLECYSTECTOMY     • COLONOSCOPY      Dr. Farley   • COLONOSCOPY N/A 2017    Procedure: COLONOSCOPY;  Surgeon: Leslie Majano MD;  Location: [facility];  Service:    • DILATATION AND CURETTAGE      Dr Boles for bleeding   • EXPLORATORY LAPAROTOMY  2015    BSO and removal of 12 cm peritoneal inclusion cyst with clear fluid Dr Selwyn Beebe [facility]   • TOTAL ABDOMINAL HYSTERECTOMY  2004    fibroids,anemia, post op wound  "infection ([place])         FAMILY HISTORY  Family History   Problem Relation Age of Onset   • Hypertension Mother    • Breast cancer Mother 68   • Colon cancer Father 69   • Breast cancer Sister 40   • Colon cancer Brother 62   • No Known Problems Son    • No Known Problems Daughter          SOCIAL HISTORY  Social History     Occupational History   •  [employer]     Social History Main Topics   • Smoking status: Never Smoker   • Smokeless tobacco: Never Used   • Alcohol use Yes      Comment: rarely   • Drug use: No   • Sexual activity: Yes     Partners: Male     Birth control/ protection: None         CURRENT MEDICATIONS    Current Outpatient Prescriptions:   •  ALPRAZolam (XANAX) 0.5 MG tablet, TAKE ONE TABLET THREE TIMES DAILY, Disp: , Rfl: 0  •  amitriptyline (ELAVIL) 50 MG tablet, 1 TABLET BY MOUTH AT BEDTIME FOR SLEEP, Disp: , Rfl: 2  •  butalbital-acetaminophen-caffeine (ORBIVAN) -40 MG capsule capsule, TAKE ONE CAPSULE BY MOUTH EVERY 4 TO 6 HOURS AS NEEDED MAX OF 6 IN 24 HOURS, Disp: , Rfl: 0  •  ibuprofen (ADVIL,MOTRIN) 800 MG tablet, , Disp: , Rfl:   •  oxyCODONE (ROXICODONE) 15 MG immediate release tablet, TAKE 1 TABLET BY MOUTH THREE TIMES A DAY., Disp: , Rfl: 0  •  sertraline (ZOLOFT) 100 MG tablet, TAKE 1 TABLET BY MOUTH IN THE MORNING, Disp: , Rfl: 2  •  tiZANidine (ZANAFLEX) 4 MG tablet, TAKE 1 TABLET BY MOUTH EVERY EIGHT HOURS AS NEEDED FOR MUSCLE SPASM., Disp: , Rfl: 5    ALLERGIES  Penicillins and Naproxen    VITALS  Vitals:    04/17/17 0947   BP: 154/100   Pulse: 96   Temp: 98.2 °F (36.8 °C)   TempSrc: Oral   SpO2: 99%   Weight: 249 lb (113 kg)   Height: 60\" (152.4 cm)       LAST RESULTS   Admission on 04/11/2017, Discharged on 04/11/2017   Component Date Value Ref Range Status   • Glucose 04/11/2017 91  70 - 130 mg/dL Final     No results found.    PHYSICAL EXAM  Physical Exam   Constitutional: She is oriented to person, place, and time. She appears well-developed and " well-nourished.   Cardiovascular: Normal rate, regular rhythm and normal heart sounds.    Pulmonary/Chest: Breath sounds normal.   Abdominal:   Soft, obese, very large pannus.  There is a well-healed lower midline laparotomy wound.  To the left of this scar is a reducible large ventral/incisional hernia.  It is nontender to palpation.      Neurological: She is alert and oriented to person, place, and time.   Skin: Skin is warm and dry.   Nursing note and vitals reviewed.      ASSESSMENT  Incisional/ventral hernia  Abdominal pain     I have had a lengthy discussion with the patient -- surgical versus nonsurgical options discussed. Pros and cons / risks and benefits discussed.  I have also discussed the case with my associate Dr. Cordova.  And we feel that she will be a good candidate for a ventral incisional hernia repair with separation of components and advancement of myofascial flaps.  We have discussed the procedure, risks, benefits, complications including but not limited to risk of bleeding, infection, risk of hematoma formation, seroma formation, risk of flap ischemia/necrosis, risk of recurrence, risk of infections associated with mesh or mesh migration as well as cardiopulmonary complications have thoroughly been discussed with the patient who understands and wants to proceed.    Surgery will be scheduled at [facility].  She will undergo preadmission testing prior to that.  She will have to stay off blood thinners for at least 5-7 days prior to the procedure.    Colonoscopy with poor prep  Family history of colon cancer and polyps. Have recommended to the patient repeat colonoscopy in one year with 2 day prep.  We'll place on one year recall list.    We will try and get records from [facility]     PLAN  Follow-up after surgery.  Patient was advised to call the office sooner should she have worsening symptoms or go to the nearest emergency room.

## 2017-04-17 NOTE — PROGRESS NOTES
PATIENT INFORMATION  Alyssa Umana   - 1970    CHIEF COMPLAINT  Chief Complaint   Patient presents with   • Hernia   Follow-up after colonoscopy  Here to discuss hernia surgery    HISTORY OF PRESENT ILLNESS  HPI  Patient presents the office today for follow-up to discuss her colonoscopy results and discuss her future hernia surgery.  She reports she is overall doing well.  Denies any nausea vomiting change in bowel movements melena or hematochezia.  She does complain of pain associated with her left lower quadrant abdominal wall hernia.  She does not notice any further exacerbation of her existent pain.    Colonoscopy findings were discussed with the patient.  In light of her poor prep and strong family history of colon cancer I have advised her that we need to have repeat colonoscopy done in 1 year with a 2 day prep.  She is agreeable to that.    As far as the left lower quadrant abdominal wall hernia is concerned --I have reviewed the images personally and discussed the case as well as her CT scan with my associate Dr. Cordova.  She has a large left sided abdominal wall incisional hernia, with a loop of bowel contained within the hernia sac within a complicating features i.e. bowel obstruction or strangulation.    As far as past surgical history is concerned-She reports in 2015 she underwent excision of a large peritoneal cyst by GYN/ ONC at [facility]. The surgery was complicated with what appears to be a wound infection which from what the patient is describing was initially managed with negative pressure wound therapy i.e wound VAC and then switched to wet-to-dry dressings using Dakin's solution. Patient reports she also saw another surgeon at the wound care center who recommended she continue with the wet-to-dry dressings. She reports she underwent multiple incision and debridement as well as percutaneous drainage ×2 off what appears to be subcutaneous infected tissue  postoperatively. She reports it occurs 6 months to heal this wound.  I do not have any records available as to what organisms were cultured from her wound infections.  Will contact [facility] to obtain those records.        REVIEW OF SYSTEMS  Review of Systems   Constitutional: Positive for activity change and unexpected weight change.   Respiratory: Negative.    Cardiovascular: Negative.    Gastrointestinal: Positive for abdominal pain.   Psychiatric/Behavioral: The patient is nervous/anxious.          ACTIVE PROBLEMS  Patient Active Problem List    Diagnosis   • Colon cancer screening [Z12.11]   • Family history of colon cancer requiring screening colonoscopy [Z80.0]   • Family history of polyps in the colon [Z83.71]   • Plantar fasciitis [M72.2]   • Thoracic back pain [M54.6]   • Hypertension [I10]   • Cutaneous fungal infection [B36.9]   • Adiposity [E66.9]         PAST MEDICAL HISTORY  Past Medical History:   Diagnosis Date   • Diabetes mellitus     MEDICALLY INDUCED   • Family history of breast cancer in mother     age 68   • Family history of breast cancer in sister     age 40   • Family history of colon cancer     Father age 69, Brother  age 62   • Hypertension     W PNEUMONIA   • Migraine    • Pneumonia    • Surgical menopause 2015         SURGICAL HISTORY  Past Surgical History:   Procedure Laterality Date   • BILATERAL SALPINGO OOPHORECTOMY  2015    Dr Fuentes Beebe   •  SECTION     •  SECTION PRIMARY     • CHOLECYSTECTOMY     • COLONOSCOPY      Dr. Farley   • COLONOSCOPY N/A 2017    Procedure: COLONOSCOPY;  Surgeon: Leslie Majano MD;  Location: [facility];  Service:    • DILATATION AND CURETTAGE      Dr Boles for bleeding   • EXPLORATORY LAPAROTOMY  2015    BSO and removal of 12 cm peritoneal inclusion cyst with clear fluid Dr Selwyn Beebe [facility]   • TOTAL ABDOMINAL HYSTERECTOMY  2004    fibroids,anemia, post op wound  "infection ([place])         FAMILY HISTORY  Family History   Problem Relation Age of Onset   • Hypertension Mother    • Breast cancer Mother 68   • Colon cancer Father 69   • Breast cancer Sister 40   • Colon cancer Brother 62   • No Known Problems Son    • No Known Problems Daughter          SOCIAL HISTORY  Social History     Occupational History   •  [employer]     Social History Main Topics   • Smoking status: Never Smoker   • Smokeless tobacco: Never Used   • Alcohol use Yes      Comment: rarely   • Drug use: No   • Sexual activity: Yes     Partners: Male     Birth control/ protection: None         CURRENT MEDICATIONS    Current Outpatient Prescriptions:   •  ALPRAZolam (XANAX) 0.5 MG tablet, TAKE ONE TABLET THREE TIMES DAILY, Disp: , Rfl: 0  •  amitriptyline (ELAVIL) 50 MG tablet, 1 TABLET BY MOUTH AT BEDTIME FOR SLEEP, Disp: , Rfl: 2  •  butalbital-acetaminophen-caffeine (ORBIVAN) -40 MG capsule capsule, TAKE ONE CAPSULE BY MOUTH EVERY 4 TO 6 HOURS AS NEEDED MAX OF 6 IN 24 HOURS, Disp: , Rfl: 0  •  ibuprofen (ADVIL,MOTRIN) 800 MG tablet, , Disp: , Rfl:   •  oxyCODONE (ROXICODONE) 15 MG immediate release tablet, TAKE 1 TABLET BY MOUTH THREE TIMES A DAY., Disp: , Rfl: 0  •  sertraline (ZOLOFT) 100 MG tablet, TAKE 1 TABLET BY MOUTH IN THE MORNING, Disp: , Rfl: 2  •  tiZANidine (ZANAFLEX) 4 MG tablet, TAKE 1 TABLET BY MOUTH EVERY EIGHT HOURS AS NEEDED FOR MUSCLE SPASM., Disp: , Rfl: 5    ALLERGIES  Penicillins and Naproxen    VITALS  Vitals:    04/17/17 0947   BP: 154/100   Pulse: 96   Temp: 98.2 °F (36.8 °C)   TempSrc: Oral   SpO2: 99%   Weight: 249 lb (113 kg)   Height: 60\" (152.4 cm)       LAST RESULTS   Admission on 04/11/2017, Discharged on 04/11/2017   Component Date Value Ref Range Status   • Glucose 04/11/2017 91  70 - 130 mg/dL Final     No results found.    PHYSICAL EXAM  Physical Exam   Constitutional: She is oriented to person, place, and time. She appears well-developed and " well-nourished.   Cardiovascular: Normal rate, regular rhythm and normal heart sounds.    Pulmonary/Chest: Breath sounds normal.   Abdominal:   Soft, obese, very large pannus.  There is a well-healed lower midline laparotomy wound.  To the left of this scar is a reducible large ventral/incisional hernia.  It is nontender to palpation.      Neurological: She is alert and oriented to person, place, and time.   Skin: Skin is warm and dry.   Nursing note and vitals reviewed.      ASSESSMENT  Incisional/ventral hernia  Abdominal pain     I have had a lengthy discussion with the patient -- surgical versus nonsurgical options discussed. Pros and cons / risks and benefits discussed.  I have also discussed the case with my associate Dr. Cordova.  And we feel that she will be a good candidate for a ventral incisional hernia repair with separation of components and advancement of myofascial flaps.  We have discussed the procedure, risks, benefits, complications including but not limited to risk of bleeding, infection, risk of hematoma formation, seroma formation, risk of flap ischemia/necrosis, risk of recurrence, risk of infections associated with mesh or mesh migration as well as cardiopulmonary complications have thoroughly been discussed with the patient who understands and wants to proceed.    Surgery will be scheduled at [facility].  She will undergo preadmission testing prior to that.  She will have to stay off blood thinners for at least 5-7 days prior to the procedure.    Colonoscopy with poor prep  Family history of colon cancer and polyps. Have recommended to the patient repeat colonoscopy in one year with 2 day prep.  We'll place on one year recall list.    We will try and get records from [facility]     PLAN  Follow-up after surgery.  Patient was advised to call the office sooner should she have worsening symptoms or go to the nearest emergency room.

## 2017-04-18 ENCOUNTER — APPOINTMENT (OUTPATIENT)
Dept: CT IMAGING | Facility: HOSPITAL | Age: 47
End: 2017-04-18

## 2017-04-18 ENCOUNTER — ANESTHESIA EVENT (OUTPATIENT)
Dept: PERIOP | Facility: HOSPITAL | Age: 47
End: 2017-04-18

## 2017-04-18 ENCOUNTER — HOSPITAL ENCOUNTER (EMERGENCY)
Facility: HOSPITAL | Age: 47
Discharge: HOME | End: 2017-04-18
Attending: EMERGENCY MEDICINE | Admitting: EMERGENCY MEDICINE

## 2017-04-18 VITALS
DIASTOLIC BLOOD PRESSURE: 93 MMHG | HEIGHT: 60 IN | WEIGHT: 246 LBS | RESPIRATION RATE: 18 BRPM | BODY MASS INDEX: 48.29 KG/M2 | TEMPERATURE: 98.2 F | OXYGEN SATURATION: 99 % | HEART RATE: 81 BPM | SYSTOLIC BLOOD PRESSURE: 142 MMHG

## 2017-04-18 DIAGNOSIS — K43.9 ABDOMINAL WALL HERNIA: Primary | ICD-10-CM

## 2017-04-18 LAB
ALBUMIN SERPL-MCNC: 3.8 G/DL (ref 3.5–5.2)
ALBUMIN/GLOB SERPL: 1.4 G/DL
ALP SERPL-CCNC: 113 U/L (ref 40–129)
ALT SERPL W P-5'-P-CCNC: 86 U/L (ref 5–33)
ANION GAP SERPL CALCULATED.3IONS-SCNC: 10.9 MMOL/L
AST SERPL-CCNC: 37 U/L (ref 5–32)
BASOPHILS # BLD AUTO: 0.02 10*3/MM3 (ref 0–0.2)
BASOPHILS NFR BLD AUTO: 0.4 % (ref 0–2)
BILIRUB SERPL-MCNC: 0.2 MG/DL (ref 0.2–1.2)
BILIRUB UR QL STRIP: NEGATIVE
BUN BLD-MCNC: 15 MG/DL (ref 6–20)
BUN/CREAT SERPL: 17.4 (ref 7–25)
CALCIUM SPEC-SCNC: 8.9 MG/DL (ref 8.6–10.5)
CHLORIDE SERPL-SCNC: 99 MMOL/L (ref 98–107)
CLARITY UR: ABNORMAL
CO2 SERPL-SCNC: 29.1 MMOL/L (ref 22–29)
COLOR UR: YELLOW
CREAT BLD-MCNC: 0.86 MG/DL (ref 0.57–1)
D-LACTATE SERPL-SCNC: 1.6 MMOL/L (ref 0.5–2)
DEPRECATED RDW RBC AUTO: 42.8 FL (ref 37–54)
EOSINOPHIL # BLD AUTO: 0.07 10*3/MM3 (ref 0.1–0.3)
EOSINOPHIL NFR BLD AUTO: 1.4 % (ref 0–4)
ERYTHROCYTE [DISTWIDTH] IN BLOOD BY AUTOMATED COUNT: 13.6 % (ref 11.5–14.5)
GFR SERPL CREATININE-BSD FRML MDRD: 86 ML/MIN/1.73
GLOBULIN UR ELPH-MCNC: 2.7 GM/DL
GLUCOSE BLD-MCNC: 124 MG/DL (ref 65–99)
GLUCOSE UR STRIP-MCNC: NEGATIVE MG/DL
HCT VFR BLD AUTO: 39.4 % (ref 37–47)
HGB BLD-MCNC: 13.1 G/DL (ref 12–16)
HGB UR QL STRIP.AUTO: NEGATIVE
IMM GRANULOCYTES # BLD: 0.01 10*3/MM3 (ref 0–0.03)
IMM GRANULOCYTES NFR BLD: 0.2 % (ref 0–0.5)
KETONES UR QL STRIP: NEGATIVE
LEUKOCYTE ESTERASE UR QL STRIP.AUTO: NEGATIVE
LYMPHOCYTES # BLD AUTO: 1.63 10*3/MM3 (ref 0.6–4.8)
LYMPHOCYTES NFR BLD AUTO: 32.8 % (ref 20–45)
MCH RBC QN AUTO: 28.5 PG (ref 27–31)
MCHC RBC AUTO-ENTMCNC: 33.2 G/DL (ref 31–37)
MCV RBC AUTO: 85.7 FL (ref 81–99)
MONOCYTES # BLD AUTO: 0.5 10*3/MM3 (ref 0–1)
MONOCYTES NFR BLD AUTO: 10.1 % (ref 3–8)
NEUTROPHILS # BLD AUTO: 2.74 10*3/MM3 (ref 1.5–8.3)
NEUTROPHILS NFR BLD AUTO: 55.1 % (ref 45–70)
NITRITE UR QL STRIP: NEGATIVE
NRBC BLD MANUAL-RTO: 0 /100 WBC (ref 0–0)
PH UR STRIP.AUTO: 6 [PH] (ref 4.5–8)
PLATELET # BLD AUTO: 244 10*3/MM3 (ref 140–500)
PMV BLD AUTO: 9.1 FL (ref 7.4–10.4)
POTASSIUM BLD-SCNC: 3.2 MMOL/L (ref 3.5–5.2)
PROT SERPL-MCNC: 6.5 G/DL (ref 6–8.5)
PROT UR QL STRIP: NEGATIVE
RBC # BLD AUTO: 4.6 10*6/MM3 (ref 4.2–5.4)
SODIUM BLD-SCNC: 139 MMOL/L (ref 136–145)
SP GR UR STRIP: 1.01 (ref 1–1.03)
UROBILINOGEN UR QL STRIP: ABNORMAL
WBC NRBC COR # BLD: 4.97 10*3/MM3 (ref 4.8–10.8)

## 2017-04-18 PROCEDURE — 96374 THER/PROPH/DIAG INJ IV PUSH: CPT

## 2017-04-18 PROCEDURE — 96361 HYDRATE IV INFUSION ADD-ON: CPT

## 2017-04-18 PROCEDURE — 96376 TX/PRO/DX INJ SAME DRUG ADON: CPT

## 2017-04-18 PROCEDURE — 83605 ASSAY OF LACTIC ACID: CPT | Performed by: EMERGENCY MEDICINE

## 2017-04-18 PROCEDURE — 96375 TX/PRO/DX INJ NEW DRUG ADDON: CPT

## 2017-04-18 PROCEDURE — 99284 EMERGENCY DEPT VISIT MOD MDM: CPT | Performed by: EMERGENCY MEDICINE

## 2017-04-18 PROCEDURE — 25010000002 HYDROMORPHONE PER 4 MG: Performed by: EMERGENCY MEDICINE

## 2017-04-18 PROCEDURE — 25010000002 ONDANSETRON PER 1 MG: Performed by: EMERGENCY MEDICINE

## 2017-04-18 PROCEDURE — 85025 COMPLETE CBC W/AUTO DIFF WBC: CPT | Performed by: EMERGENCY MEDICINE

## 2017-04-18 PROCEDURE — 99283 EMERGENCY DEPT VISIT LOW MDM: CPT

## 2017-04-18 PROCEDURE — 25010000002 PROMETHAZINE PER 50 MG: Performed by: EMERGENCY MEDICINE

## 2017-04-18 PROCEDURE — 81003 URINALYSIS AUTO W/O SCOPE: CPT | Performed by: EMERGENCY MEDICINE

## 2017-04-18 PROCEDURE — 80053 COMPREHEN METABOLIC PANEL: CPT | Performed by: EMERGENCY MEDICINE

## 2017-04-18 PROCEDURE — 0 IOPAMIDOL PER 1 ML: Performed by: EMERGENCY MEDICINE

## 2017-04-18 PROCEDURE — 74177 CT ABD & PELVIS W/CONTRAST: CPT

## 2017-04-18 RX ORDER — PROMETHAZINE HYDROCHLORIDE 25 MG/1
25 TABLET ORAL EVERY 6 HOURS PRN
Qty: 10 TABLET | Refills: 0 | Status: SHIPPED | OUTPATIENT
Start: 2017-04-18 | End: 2017-04-18 | Stop reason: HOSPADM

## 2017-04-18 RX ORDER — PROMETHAZINE HYDROCHLORIDE 25 MG/1
25 TABLET ORAL EVERY 6 HOURS PRN
Qty: 10 TABLET | Refills: 0 | Status: SHIPPED | OUTPATIENT
Start: 2017-04-18 | End: 2017-04-28 | Stop reason: HOSPADM

## 2017-04-18 RX ORDER — AMITRIPTYLINE HYDROCHLORIDE 100 MG/1
100 TABLET, FILM COATED ORAL NIGHTLY
COMMUNITY

## 2017-04-18 RX ORDER — HYDROCODONE BITARTRATE AND ACETAMINOPHEN 5; 325 MG/1; MG/1
1 TABLET ORAL EVERY 4 HOURS PRN
Qty: 30 TABLET | Refills: 0 | Status: SHIPPED | OUTPATIENT
Start: 2017-04-18 | End: 2017-04-18 | Stop reason: HOSPADM

## 2017-04-18 RX ORDER — ONDANSETRON 2 MG/ML
4 INJECTION INTRAMUSCULAR; INTRAVENOUS ONCE
Status: COMPLETED | OUTPATIENT
Start: 2017-04-18 | End: 2017-04-18

## 2017-04-18 RX ORDER — ALPRAZOLAM 0.25 MG/1
0.25 TABLET ORAL 3 TIMES DAILY PRN
COMMUNITY

## 2017-04-18 RX ORDER — PROMETHAZINE HYDROCHLORIDE 25 MG/ML
12.5 INJECTION, SOLUTION INTRAMUSCULAR; INTRAVENOUS ONCE
Status: COMPLETED | OUTPATIENT
Start: 2017-04-18 | End: 2017-04-18

## 2017-04-18 RX ADMIN — IOPAMIDOL 100 ML: 755 INJECTION, SOLUTION INTRAVENOUS at 20:35

## 2017-04-18 RX ADMIN — ONDANSETRON 4 MG: 2 INJECTION, SOLUTION INTRAMUSCULAR; INTRAVENOUS at 18:11

## 2017-04-18 RX ADMIN — PROMETHAZINE HYDROCHLORIDE 12.5 MG: 25 INJECTION INTRAMUSCULAR; INTRAVENOUS at 19:09

## 2017-04-18 RX ADMIN — HYDROMORPHONE HYDROCHLORIDE 1 MG: 1 INJECTION, SOLUTION INTRAMUSCULAR; INTRAVENOUS; SUBCUTANEOUS at 18:11

## 2017-04-18 RX ADMIN — ONDANSETRON 4 MG: 2 INJECTION, SOLUTION INTRAMUSCULAR; INTRAVENOUS at 18:36

## 2017-04-18 RX ADMIN — SODIUM CHLORIDE 1000 ML: 900 INJECTION, SOLUTION INTRAVENOUS at 18:11

## 2017-04-18 NOTE — ED PROVIDER NOTES
Subjective   History of Present Illness  History of Present Illness    Chief complaint: Abdominal pain    Location: Side of abdomen    Quality/Severity:  Moderate, sharp    Timing/Onset/Duration: Onset over the last few days, worse today    Modifying Factors: Movement makes it worse, remaining still makes it better    Associated Symptoms: No headache.  No fever chills or cough.  No sore throat earache or nasal congestion.  No chest pain or shortness breath.  The patient has nausea but no vomiting.  There is no diarrhea or burning when she urinates.    Narrative: This 46-year-old -American female presents with left lower abdominal pain that got worse today around 1:30 after she woke up from a nap.  The patient is scheduled to have repair of an abdominal wall hernia by Dr. Majano on April 24, 2017.  The patient denies any fever or chills.  The patient has nausea but no vomiting.  The pain is sharp.  It is worse with movement, better if she remains still.  She denies any diarrhea or burning when she urinates.  The patient hasn't states she has not been passing gas today.  She had a small bowel movement yesterday after taking an enema.  The patient called Dr. Majano who sent her to the emergency department today for an abdominal CT.  A shunt was seen in Dr. Majano's office yesterday and the patient states that Dr. Majano could reduce the hernia.    PCP:  Janny    Surgeon:  Gretel        Review of Systems   Constitutional: Negative for chills and fever.   HENT: Negative for congestion, ear pain and sore throat.    Eyes: Negative for pain and discharge.   Respiratory: Negative for cough, chest tightness, shortness of breath and wheezing.    Cardiovascular: Negative for chest pain, palpitations and leg swelling.   Gastrointestinal: Positive for abdominal pain, constipation and nausea. Negative for blood in stool, diarrhea and vomiting.   Genitourinary: Negative for decreased urine volume, dysuria, flank pain, frequency,  genital sores, hematuria, menstrual problem, pelvic pain, urgency, vaginal bleeding, vaginal discharge and vaginal pain.   Musculoskeletal: Negative for back pain.   Skin: Negative for rash.   Neurological: Negative for weakness and headaches.   Hematological: Negative for adenopathy.   Psychiatric/Behavioral: Negative for confusion.        Medication List      ASK your doctor about these medications          * ALPRAZolam 0.5 MG tablet   Commonly known as:  XANAX       * ALPRAZolam 0.25 MG tablet   Commonly known as:  XANAX       * amitriptyline 50 MG tablet   Commonly known as:  ELAVIL       * amitriptyline 100 MG tablet   Commonly known as:  ELAVIL       butalbital-acetaminophen-caffeine -40 MG capsule capsule   Commonly known as:  ORBIVAN       ibuprofen 800 MG tablet   Commonly known as:  ADVIL,MOTRIN       oxyCODONE 15 MG immediate release tablet   Commonly known as:  ROXICODONE       sertraline 100 MG tablet   Commonly known as:  ZOLOFT       tiZANidine 4 MG tablet   Commonly known as:  ZANAFLEX       * Notice:  This list has 4 medication(s) that are the same as other   medications prescribed for you. Read the directions carefully, and ask   your doctor or other care provider to review them with you.        Past Medical History:   Diagnosis Date   • Diabetes mellitus     MEDICALLY INDUCED   • Family history of breast cancer in mother     age 68   • Family history of breast cancer in sister     age 40   • Family history of colon cancer     Father age 69, Brother  age 62   • Hypertension     W PNEUMONIA   • Migraine    • Pneumonia    • Surgical menopause 2015       Allergies   Allergen Reactions   • Penicillins Rash   • Naproxen      Severe stomach cramps       Past Surgical History:   Procedure Laterality Date   • BILATERAL SALPINGO OOPHORECTOMY  2015    Dr Fuentes Beebe   •  SECTION     •  SECTION PRIMARY     • CHOLECYSTECTOMY     • COLONOSCOPY        Martine   • COLONOSCOPY N/A 4/11/2017    Procedure: COLONOSCOPY;  Surgeon: Leslie Majano MD;  Location: [facility];  Service:    • DILATATION AND CURETTAGE  1989    Dr Boles for bleeding   • EXPLORATORY LAPAROTOMY  04/14/2015    BSO and removal of 12 cm peritoneal inclusion cyst with clear fluid Dr Selwyn Beebe [facility]   • TOTAL ABDOMINAL HYSTERECTOMY  2004    fibroids,anemia, post op wound infection ([place])       Family History   Problem Relation Age of Onset   • Hypertension Mother    • Breast cancer Mother 68   • Colon cancer Father 69   • Breast cancer Sister 40   • Colon cancer Brother 62   • No Known Problems Son    • No Known Problems Daughter        Social History     Social History   • Marital status:      Spouse name: N/A   • Number of children: 2   • Years of education: N/A     Occupational History   •  [employer]     Social History Main Topics   • Smoking status: Never Smoker   • Smokeless tobacco: Never Used   • Alcohol use Yes      Comment: rarely   • Drug use: No   • Sexual activity: Yes     Partners: Male     Birth control/ protection: None     Other Topics Concern   • None     Social History Narrative    Ob/gyn patient since 12/20/10.           Objective   Physical Exam   Constitutional: She is oriented to person, place, and time. She appears well-developed and well-nourished. No distress.   ED Triage Vitals:  Temp: 98.2 °F (36.8 °C) (04/18/17 1650)  Heart Rate: 99 (04/18/17 1650)  Resp: 20 (04/18/17 1650)  BP: 151/109 (04/18/17 1650)  SpO2: 99 % (04/18/17 1650)  Temp src: Oral (04/18/17 1650)  Heart Rate Source: n/a  Patient Position: Sitting (04/18/17 1650)  BP Location: Right arm (04/18/17 1650)  FiO2 (%): n/a    The patient's vitals were reviewed by me.  Unless otherwise noted they are within normal limits.  The patient is hypertensive with a blood pressure 151/109.  She has a moderate amount of pain.     HENT:   Head: Normocephalic and atraumatic.   Right Ear:  External ear normal.   Left Ear: External ear normal.   Nose: Nose normal.   Mouth/Throat: Oropharynx is clear and moist.   Eyes: Conjunctivae and EOM are normal. Pupils are equal, round, and reactive to light. Right eye exhibits no discharge. Left eye exhibits no discharge. No scleral icterus.   Neck: Normal range of motion. Neck supple. No JVD present. No tracheal deviation present. No thyromegaly present.   Cardiovascular: Normal rate, regular rhythm, normal heart sounds and intact distal pulses.  Exam reveals no gallop and no friction rub.    No murmur heard.  Pulmonary/Chest: Effort normal and breath sounds normal. No stridor. No respiratory distress. She has no wheezes. She has no rales. She exhibits no tenderness.   Abdominal: Soft. Bowel sounds are normal. She exhibits no distension and no mass. There is tenderness. There is no rebound and no guarding. A hernia (left lower quadrant it does appear to reduce.) is present.   Musculoskeletal: Normal range of motion. She exhibits no edema or deformity.   Lymphadenopathy:     She has no cervical adenopathy.   Neurological: She is alert and oriented to person, place, and time.   Skin: Skin is warm and dry. No rash noted. She is not diaphoretic. No erythema. No pallor.   Psychiatric: Her behavior is normal.   Nursing note and vitals reviewed.      Procedures         ED Course  ED Course   Comment By Time   The laboratory values were reviewed by me.  The glucose is 124.  The potassium 3.2.  CO2 is 29.1.  A LT is 86, the AST is 37.  The laboratory values are otherwise unremarkable. Hector Lee MD 04/18 1934   The CT the abdomen as read by  shows nothing acute.  There is an area inferior abdominal wall hernia containing small bowel loops with no complicating features.  The entrance to the hernia sac measures 7 cm. Hector Lee MD 04/18 2127                  [facility]  CT Abdomen Pelvis With Contrast    (Results Pending)     Labs Reviewed   COMPREHENSIVE  METABOLIC PANEL - Abnormal; Notable for the following:        Result Value    Glucose 124 (*)     Potassium 3.2 (*)     CO2 29.1 (*)     ALT (SGPT) 86 (*)     AST (SGOT) 37 (*)     All other components within normal limits   URINALYSIS W/ CULTURE IF INDICATED - Abnormal; Notable for the following:     Appearance, UA Turbid (*)     All other components within normal limits    Narrative:     Urine microscopic not indicated.   CBC WITH AUTO DIFFERENTIAL - Abnormal; Notable for the following:     Monocyte % 10.1 (*)     Eosinophils, Absolute 0.07 (*)     All other components within normal limits   LACTIC ACID, PLASMA - Normal   CBC AND DIFFERENTIAL    Narrative:     The following orders were created for panel order CBC & Differential.  Procedure                               Abnormality         Status                     ---------                               -----------         ------                     CBC Auto Differential[[id number]]         Abnormal            Final result                 Please view results for these tests on the individual orders.     10:02 PM, 04/18/17:  The patient was reassessed.  She feels better.  Her vital signs were reviewed and are stable.  Abdominal exam: Soft nontender no masses positive bowel sounds.    10:02 PM, 04/18/17:  Dr. Majano evaluated the patient in the emergency department and reviewed the CT.  The plan will be to send the patient home on pain medication and nausea medication.  The patient should call Dr. Majano if she worsens.  Patient should return to emergency department if she has increasing pain, vomiting, vomiting blood, fever, chills, worse in any way at all.    10:11 PM, 04/18/17:  The [facility] report was reviewed by me.  The report number is [id number].    Final diagnoses:   None         ED Medications:  Medications   HYDROmorphone (DILAUDID) injection 1 mg (1 mg Intravenous Given 4/18/17 1811)   ondansetron (ZOFRAN) injection 4 mg (4 mg Intravenous Given 4/18/17 1811)    sodium chloride 0.9 % bolus 1,000 mL (0 mL Intravenous Stopped 4/18/17 1945)   ondansetron (ZOFRAN) injection 4 mg (4 mg Intravenous Given 4/18/17 1836)   promethazine (PHENERGAN) injection 12.5 mg (12.5 mg Intravenous Given 4/18/17 1909)   iopamidol (ISOVUE-370) 76 % injection 100 mL (100 mL Intravenous Given 4/18/17 2035)       New Medications:     Medication List      ASK your doctor about these medications          * ALPRAZolam 0.5 MG tablet   Commonly known as:  XANAX       * ALPRAZolam 0.25 MG tablet   Commonly known as:  XANAX       * amitriptyline 50 MG tablet   Commonly known as:  ELAVIL       * amitriptyline 100 MG tablet   Commonly known as:  ELAVIL       butalbital-acetaminophen-caffeine -40 MG capsule capsule   Commonly known as:  ORBIVAN       ibuprofen 800 MG tablet   Commonly known as:  ADVIL,MOTRIN       oxyCODONE 15 MG immediate release tablet   Commonly known as:  ROXICODONE       sertraline 100 MG tablet   Commonly known as:  ZOLOFT       tiZANidine 4 MG tablet   Commonly known as:  ZANAFLEX       * Notice:  This list has 4 medication(s) that are the same as other   medications prescribed for you. Read the directions carefully, and ask   your doctor or other care provider to review them with you.        Stopped Medications:     Medication List      ASK your doctor about these medications          * ALPRAZolam 0.5 MG tablet   Commonly known as:  XANAX       * ALPRAZolam 0.25 MG tablet   Commonly known as:  XANAX       * amitriptyline 50 MG tablet   Commonly known as:  ELAVIL       * amitriptyline 100 MG tablet   Commonly known as:  ELAVIL       butalbital-acetaminophen-caffeine -40 MG capsule capsule   Commonly known as:  ORBIVAN       ibuprofen 800 MG tablet   Commonly known as:  ADVIL,MOTRIN       oxyCODONE 15 MG immediate release tablet   Commonly known as:  ROXICODONE       sertraline 100 MG tablet   Commonly known as:  ZOLOFT       tiZANidine 4 MG tablet   Commonly known as:   ZANAFLEX       * Notice:  This list has 4 medication(s) that are the same as other   medications prescribed for you. Read the directions carefully, and ask   your doctor or other care provider to review them with you.          Final diagnoses:   Abdominal wall hernia            Hector Lee MD  04/18/17 2205       Hector Lee MD  04/18/17 2352

## 2017-04-19 NOTE — DISCHARGE INSTRUCTIONS
Take your pain medications as prescribed.  Take the Phenergan as needed for nausea and vomiting.  Call Dr. Majano if you get worse.  Return to emergency department if you have increasing pain, vomiting not controlled with Phenergan, pain not controlled by your pain medication, vomiting blood, fever, worse in any way at all.

## 2017-04-20 ENCOUNTER — APPOINTMENT (OUTPATIENT)
Dept: PREADMISSION TESTING | Facility: HOSPITAL | Age: 47
End: 2017-04-20

## 2017-04-20 ENCOUNTER — HOSPITAL ENCOUNTER (OUTPATIENT)
Dept: GENERAL RADIOLOGY | Facility: HOSPITAL | Age: 47
Discharge: HOME | End: 2017-04-20
Admitting: SURGERY

## 2017-04-20 VITALS
BODY MASS INDEX: 48.88 KG/M2 | DIASTOLIC BLOOD PRESSURE: 83 MMHG | OXYGEN SATURATION: 98 % | HEIGHT: 60 IN | WEIGHT: 249 LBS | HEART RATE: 95 BPM | RESPIRATION RATE: 18 BRPM | SYSTOLIC BLOOD PRESSURE: 128 MMHG

## 2017-04-20 DIAGNOSIS — K43.9 VENTRAL HERNIA WITHOUT OBSTRUCTION OR GANGRENE: ICD-10-CM

## 2017-04-20 DIAGNOSIS — K43.2 INCISIONAL HERNIA, WITHOUT OBSTRUCTION OR GANGRENE: ICD-10-CM

## 2017-04-20 LAB
ALBUMIN SERPL-MCNC: 3.7 G/DL (ref 3.5–5.2)
ALBUMIN/GLOB SERPL: 1.1 G/DL
ALP SERPL-CCNC: 109 U/L (ref 40–129)
ALT SERPL W P-5'-P-CCNC: 65 U/L (ref 5–33)
ANION GAP SERPL CALCULATED.3IONS-SCNC: 15 MMOL/L
AST SERPL-CCNC: 29 U/L (ref 5–32)
BASOPHILS NFR BLD AUTO: 0 % (ref 0–2)
BILIRUB SERPL-MCNC: 0.2 MG/DL (ref 0.2–1.2)
BILIRUB UR QL STRIP: ABNORMAL
BUN BLD-MCNC: 8 MG/DL (ref 6–20)
BUN/CREAT SERPL: 8.2 (ref 7–25)
CALCIUM SPEC-SCNC: 8.8 MG/DL (ref 8.6–10.5)
CHLORIDE SERPL-SCNC: 96 MMOL/L (ref 98–107)
CLARITY UR: CLEAR
CO2 SERPL-SCNC: 27 MMOL/L (ref 22–29)
COLOR UR: YELLOW
CREAT BLD-MCNC: 0.97 MG/DL (ref 0.57–1)
DEPRECATED RDW RBC AUTO: 45.4 FL (ref 37–54)
EOSINOPHIL NFR BLD AUTO: 0 % (ref 0–4)
ERYTHROCYTE [DISTWIDTH] IN BLOOD BY AUTOMATED COUNT: 13.9 % (ref 11.5–14.5)
GFR SERPL CREATININE-BSD FRML MDRD: 75 ML/MIN/1.73
GLOBULIN UR ELPH-MCNC: 3.5 GM/DL
GLUCOSE BLD-MCNC: 103 MG/DL (ref 65–99)
GLUCOSE UR STRIP-MCNC: NEGATIVE MG/DL
HCT VFR BLD AUTO: 39.7 % (ref 37–47)
HGB BLD-MCNC: 13.6 G/DL (ref 12–16)
HGB UR QL STRIP.AUTO: NEGATIVE
IMM GRANULOCYTES NFR BLD: 0 % (ref 0–0.5)
INR PPP: 0.95 (ref 0.9–1.1)
KETONES UR QL STRIP: ABNORMAL
LEUKOCYTE ESTERASE UR QL STRIP.AUTO: NEGATIVE
LYMPHOCYTES NFR BLD AUTO: 28 % (ref 20–45)
MCH RBC QN AUTO: 29.8 PG (ref 27–31)
MCHC RBC AUTO-ENTMCNC: 34.3 G/DL (ref 31–37)
MCV RBC AUTO: 87.1 FL (ref 81–99)
MONOCYTES NFR BLD AUTO: 8 % (ref 3–8)
NEUTROPHILS NFR BLD AUTO: 64 % (ref 45–70)
NITRITE UR QL STRIP: NEGATIVE
NRBC BLD MANUAL-RTO: 0 /100 WBC (ref 0–0)
PH UR STRIP.AUTO: 5.5 [PH] (ref 4.5–8)
PLATELET # BLD AUTO: 265 10*3/MM3 (ref 140–500)
PMV BLD AUTO: 8.5 FL (ref 7.4–10.4)
POTASSIUM BLD-SCNC: 3.4 MMOL/L (ref 3.5–5.2)
PROT SERPL-MCNC: 7.2 G/DL (ref 6–8.5)
PROT UR QL STRIP: NEGATIVE
PROTHROMBIN TIME: 12.7 SECONDS (ref 12.1–15)
RBC # BLD AUTO: 4.56 10*6/MM3 (ref 4.2–5.4)
SODIUM BLD-SCNC: 138 MMOL/L (ref 136–145)
SP GR UR STRIP: 1.02 (ref 1–1.03)
UROBILINOGEN UR QL STRIP: ABNORMAL
WBC NRBC COR # BLD: 5.9 10*3/MM3 (ref 4.8–10.8)

## 2017-04-20 PROCEDURE — 87081 CULTURE SCREEN ONLY: CPT

## 2017-04-20 PROCEDURE — 81003 URINALYSIS AUTO W/O SCOPE: CPT

## 2017-04-20 PROCEDURE — 85025 COMPLETE CBC W/AUTO DIFF WBC: CPT

## 2017-04-20 PROCEDURE — 71020 HC CHEST PA AND LATERAL: CPT

## 2017-04-20 PROCEDURE — 36415 COLL VENOUS BLD VENIPUNCTURE: CPT

## 2017-04-20 PROCEDURE — 85610 PROTHROMBIN TIME: CPT

## 2017-04-20 PROCEDURE — 80053 COMPREHEN METABOLIC PANEL: CPT

## 2017-04-20 NOTE — DISCHARGE INSTRUCTIONS
PRE-ADMISSION TESTING INSTRUCTIONS FOR ADULTS  No aspirin, advil, aleve, ibuprofen, naproxen, diet pills, decongestants, or herbal/vitamins for a week prior to surgery.    General Instructions:    • Do not eat or drink after midnight: includes water, mints, or gum. You may brush your teeth.  • Do not smoke, chew tobacco, or drink alcohol.  • Bring medications in original bottles, any inhalers and if applicable your C-PAP/ BI-PAP machine.  • Wear clean comfortable clothes and socks.  • Do not wear contact lenses, lotion, deodorant, or make-up.  Bring a case for your glasses if applicable.   • Bring crutches or walker if applicable.  • Leave all other valuables and jewelry at home.      Preventing a Surgical Site Infection:    • Shower the night before and on the morning of surgery using the chlorhexidine soap you were given.  Use a clean washcloth with the soap. Do not use the CHG soap on your hair, face, or private areas. Wash your body gently for five (5) minutes. Do not scrub your skin too hard.  Dry with a clean towel and dress in clean clothing.    • Do not shave the surgical area for a week prior to surgery  because the razor can irritate skin and make it easier to develop an infection.    • Make sure you, your family, and all healthcare providers clean their hands with soap and water or an alcohol based hand  before caring for you or your wound.    • If at all possible, quit smoking as many days before surgery as you can.    Day of surgery:    Your surgeon’s office will advise you of your arrival time for the day of surgery.    Upon arrival, a Pre-op nurse and Anesthesia provider will review your health history, obtain vital signs, and answer questions you may have.  The only belongings needed at this time will be your home medications and if applicable your C-PAP/BI-PAP machine.  If you are staying overnight your family can leave the rest of your belongings in the car and bring them to your room  later.  A Pre-op nurse will start an IV and you may receive medication in preparation for surgery, including something to help you relax.  Your family will be able to see you in the Pre-op area.  While you are in surgery your family should notify the waiting room  if they leave the waiting room area and provide a contact phone number.    IF you have any questions, you can call the Pre-Admission Department at [phone] or your surgeon's office.    Please be aware that surgery does come with discomfort.  We want to make every effort to control your discomfort so please discuss any uncontrolled symptoms with your nurse.   Your doctor will most likely have prescribed pain medications.      If you are going home after surgery, you will receive individualized written care instructions before being discharged.  A responsible adult (over the age of 18) must drive you to and from the hospital on the day of your surgery and stay with you for 24 hours after anesthesia.    If you are staying overnight following surgery, you will be transported to your hospital room following the recovery period.  [facility] has all private rooms.    Deductibles and co-payments are collected on the day of service. Please be prepared to pay the required co-pay, deductible or deposit on the day of service as defined by your plan.    Open Hernia Repair  Open hernia repair is surgery to fix a hernia. A hernia occurs when an internal organ or tissue pushes out through a weak spot in the abdominal wall muscles. Hernias commonly occur in the groin and around the navel. Most hernias tend to get worse over time. Surgery is often done to prevent the hernia from getting bigger, becoming uncomfortable, or becoming an emergency. Emergency surgery may be needed if abdominal contents get stuck in the opening (incarcerated hernia) or the blood supply gets cut off (strangulated hernia). In an open repair, a large cut (incision) is  made in the abdomen to perform the surgery.  LET YOUR HEALTH CARE PROVIDER KNOW ABOUT:  · Any allergies you have.  · All medicines you are taking, including vitamins, herbs, eye drops, creams, and over-the-counter medicines.  · Previous problems you or members of your family have had with the use of anesthetics.  · Any blood disorders you have.  · Previous surgeries you have had.  · Medical conditions you have.  RISKS AND COMPLICATIONS  Generally, this is a safe procedure. However, as with any procedure, complications can occur. Possible complications include:  · Infection.  · Bleeding.  · Nerve injury.  · Chronic pain.  · The hernia can come back.  · Injury to the intestines.  BEFORE THE PROCEDURE  · Ask your health care provider about changing or stopping any regular medicines. Avoid taking aspirin or blood thinners as directed by your health care provider.  · Do noteat or drink anything after midnight the night before surgery.  · If you smoke, do not smoke for at least 2 weeks before your surgery.  · Do not drink alcohol the day before your surgery.  · Let your health care provider know if you develop a cold or any infection before your surgery.  · Arrange for someone to drive you home after the procedure or after your hospital stay. Also arrange for someone to help you with activities during recovery.  PROCEDURE   · Small monitors will be put on your body. They are used to check your heart, blood pressure, and oxygen level.    · An IV access tube will be put into one of your veins. Medicine will be able to flow directly into your body through this IV tube.    · You might be given a medicine to help you relax (sedative).    · You will be given a medicine to make you sleep (general anesthetic). A breathing tube may be placed into your lungs during the procedure.  · A cut (incision) is made over the hernia defect, and the contents are pushed back into the abdomen.  · If the hernia is small, stitches may be used to  bring the muscle edges back together.  · Typically, a surgeon will place a mesh patch made of man-made material (synthetic) to cover the defect. The mesh is sewn to healthy muscle. This reduces the risk of the hernia coming back.  · The tissue and skin over the hernia are then closed with stitches or staples.  · Bandages (dressings) are used to cover the incision.  AFTER THE PROCEDURE  · You will be taken to a recovery area where your progress will be monitored.  · If the hernia was small or in the groin (inguinal) region, you will likely be allowed to go home once you are awake, stable, and taking fluids well.  · If the hernia was large, you may have to wait for your bowel function to return. You may need to stay in the hospital for 2-3 days until you can eat and your pain is controlled.       This information is not intended to replace advice given to you by your health care provider. Make sure you discuss any questions you have with your health care provider.     Document Released: 06/13/2002 Document Revised: 10/08/2014 Document Reviewed: 07/30/2014  [employer]® Patient Information ©2016 [employer].

## 2017-04-20 NOTE — PAT
Pt here for PAT visit.  Pre-op tests completed, chg soap given, and instructions reviewed.  EKG done at PCP office, scanned to chart.  Will need accu-check day of surgery.

## 2017-04-22 LAB — MRSA SPEC QL CULT: NORMAL

## 2017-04-24 ENCOUNTER — ANESTHESIA (OUTPATIENT)
Dept: PERIOP | Facility: HOSPITAL | Age: 47
End: 2017-04-24

## 2017-04-24 ENCOUNTER — HOSPITAL ENCOUNTER (INPATIENT)
Facility: HOSPITAL | Age: 47
LOS: 4 days | Discharge: HOME | End: 2017-04-28
Attending: SURGERY | Admitting: SURGERY

## 2017-04-24 DIAGNOSIS — K43.9 VENTRAL HERNIA WITHOUT OBSTRUCTION OR GANGRENE: ICD-10-CM

## 2017-04-24 DIAGNOSIS — K43.2 INCISIONAL HERNIA, WITHOUT OBSTRUCTION OR GANGRENE: ICD-10-CM

## 2017-04-24 LAB
GLUCOSE BLDC GLUCOMTR-MCNC: 100 MG/DL (ref 70–130)
MAGNESIUM SERPL-MCNC: 2.1 MG/DL (ref 1.7–2.5)
POTASSIUM BLD-SCNC: 3.3 MMOL/L (ref 3.5–5.2)

## 2017-04-24 PROCEDURE — 25010000002 MIDAZOLAM PER 1 MG: Performed by: NURSE ANESTHETIST, CERTIFIED REGISTERED

## 2017-04-24 PROCEDURE — 15734 MUSCLE-SKIN GRAFT TRUNK: CPT | Performed by: SURGERY

## 2017-04-24 PROCEDURE — 25010000002 KETOROLAC TROMETHAMINE PER 15 MG: Performed by: NURSE ANESTHETIST, CERTIFIED REGISTERED

## 2017-04-24 PROCEDURE — 49568 PR IMPLANT MESH HERNIA REPAIR/DEBRIDEMENT CLOSURE: CPT | Performed by: SURGERY

## 2017-04-24 PROCEDURE — 25010000002 SUCCINYLCHOLINE PER 20 MG: Performed by: NURSE ANESTHETIST, CERTIFIED REGISTERED

## 2017-04-24 PROCEDURE — 25010000002 NEOSTIGMINE 10 MG/10ML SOLUTION: Performed by: NURSE ANESTHETIST, CERTIFIED REGISTERED

## 2017-04-24 PROCEDURE — 25010000002 ALBUMIN HUMAN 5% PER 50 ML: Performed by: NURSE ANESTHETIST, CERTIFIED REGISTERED

## 2017-04-24 PROCEDURE — 0DNS0ZZ RELEASE GREATER OMENTUM, OPEN APPROACH: ICD-10-PCS | Performed by: SURGERY

## 2017-04-24 PROCEDURE — 99024 POSTOP FOLLOW-UP VISIT: CPT | Performed by: SURGERY

## 2017-04-24 PROCEDURE — 25010000002 PROMETHAZINE PER 50 MG: Performed by: NURSE ANESTHETIST, CERTIFIED REGISTERED

## 2017-04-24 PROCEDURE — 25010000003 MORPHINE PER 10 MG: Performed by: SURGERY

## 2017-04-24 PROCEDURE — 0DN80ZZ RELEASE SMALL INTESTINE, OPEN APPROACH: ICD-10-PCS | Performed by: SURGERY

## 2017-04-24 PROCEDURE — 49561 PR REPAIR INCISIONAL HERNIA,STRANG: CPT | Performed by: SURGERY

## 2017-04-24 PROCEDURE — 25010000002 ONDANSETRON PER 1 MG: Performed by: NURSE ANESTHETIST, CERTIFIED REGISTERED

## 2017-04-24 PROCEDURE — 25010000002 HYDROMORPHONE PER 4 MG: Performed by: NURSE ANESTHETIST, CERTIFIED REGISTERED

## 2017-04-24 PROCEDURE — P9041 ALBUMIN (HUMAN),5%, 50ML: HCPCS | Performed by: NURSE ANESTHETIST, CERTIFIED REGISTERED

## 2017-04-24 PROCEDURE — 25010000002 PROPOFOL 10 MG/ML EMULSION: Performed by: NURSE ANESTHETIST, CERTIFIED REGISTERED

## 2017-04-24 PROCEDURE — 25010000002 DEXAMETHASONE PER 1 MG: Performed by: NURSE ANESTHETIST, CERTIFIED REGISTERED

## 2017-04-24 PROCEDURE — 84132 ASSAY OF SERUM POTASSIUM: CPT | Performed by: ANESTHESIOLOGY

## 2017-04-24 PROCEDURE — 0WUF0JZ SUPPLEMENT ABDOMINAL WALL WITH SYNTHETIC SUBSTITUTE, OPEN APPROACH: ICD-10-PCS | Performed by: SURGERY

## 2017-04-24 PROCEDURE — 25010000002 FENTANYL CITRATE (PF) 100 MCG/2ML SOLUTION: Performed by: NURSE ANESTHETIST, CERTIFIED REGISTERED

## 2017-04-24 PROCEDURE — 99251 PR INITL INPATIENT CONSULT NEW/ESTAB PT 20 MIN: CPT | Performed by: NURSE PRACTITIONER

## 2017-04-24 PROCEDURE — 94799 UNLISTED PULMONARY SVC/PX: CPT

## 2017-04-24 PROCEDURE — 82962 GLUCOSE BLOOD TEST: CPT

## 2017-04-24 PROCEDURE — 83735 ASSAY OF MAGNESIUM: CPT | Performed by: NURSE PRACTITIONER

## 2017-04-24 DEVICE — SEALANT WND FIBRIN TISSEEL VAPOR/HEAT/PREFIL/SYR 10ML: Type: IMPLANTABLE DEVICE | Site: ABDOMEN | Status: FUNCTIONAL

## 2017-04-24 DEVICE — IMPLANTABLE DEVICE: Type: IMPLANTABLE DEVICE | Site: ABDOMEN | Status: FUNCTIONAL

## 2017-04-24 RX ORDER — KETOROLAC TROMETHAMINE 30 MG/ML
INJECTION, SOLUTION INTRAMUSCULAR; INTRAVENOUS AS NEEDED
Status: DISCONTINUED | OUTPATIENT
Start: 2017-04-24 | End: 2017-04-24 | Stop reason: SURG

## 2017-04-24 RX ORDER — NALOXONE HCL 0.4 MG/ML
0.1 VIAL (ML) INJECTION
Status: DISCONTINUED | OUTPATIENT
Start: 2017-04-24 | End: 2017-04-28 | Stop reason: HOSPADM

## 2017-04-24 RX ORDER — FAMOTIDINE 10 MG/ML
20 INJECTION, SOLUTION INTRAVENOUS ONCE
Status: COMPLETED | OUTPATIENT
Start: 2017-04-24 | End: 2017-04-24

## 2017-04-24 RX ORDER — KETAMINE HYDROCHLORIDE 100 MG/ML
INJECTION INTRAMUSCULAR; INTRAVENOUS AS NEEDED
Status: DISCONTINUED | OUTPATIENT
Start: 2017-04-24 | End: 2017-04-24 | Stop reason: SURG

## 2017-04-24 RX ORDER — ONDANSETRON 2 MG/ML
4 INJECTION INTRAMUSCULAR; INTRAVENOUS ONCE
Status: COMPLETED | OUTPATIENT
Start: 2017-04-24 | End: 2017-04-24

## 2017-04-24 RX ORDER — PROMETHAZINE HYDROCHLORIDE 25 MG/ML
INJECTION, SOLUTION INTRAMUSCULAR; INTRAVENOUS
Status: DISPENSED
Start: 2017-04-24 | End: 2017-04-24

## 2017-04-24 RX ORDER — PROMETHAZINE HYDROCHLORIDE 25 MG/1
25 SUPPOSITORY RECTAL ONCE AS NEEDED
Status: COMPLETED | OUTPATIENT
Start: 2017-04-24 | End: 2017-04-24

## 2017-04-24 RX ORDER — SUCCINYLCHOLINE CHLORIDE 20 MG/ML
INJECTION INTRAMUSCULAR; INTRAVENOUS AS NEEDED
Status: DISCONTINUED | OUTPATIENT
Start: 2017-04-24 | End: 2017-04-24 | Stop reason: SURG

## 2017-04-24 RX ORDER — MAGNESIUM HYDROXIDE 1200 MG/15ML
LIQUID ORAL AS NEEDED
Status: DISCONTINUED | OUTPATIENT
Start: 2017-04-24 | End: 2017-04-24 | Stop reason: HOSPADM

## 2017-04-24 RX ORDER — MIDAZOLAM HYDROCHLORIDE 1 MG/ML
1 INJECTION INTRAMUSCULAR; INTRAVENOUS AS NEEDED
Status: DISCONTINUED | OUTPATIENT
Start: 2017-04-24 | End: 2017-04-24 | Stop reason: HOSPADM

## 2017-04-24 RX ORDER — NEOSTIGMINE METHYLSULFATE 1 MG/ML
INJECTION, SOLUTION INTRAVENOUS AS NEEDED
Status: DISCONTINUED | OUTPATIENT
Start: 2017-04-24 | End: 2017-04-24 | Stop reason: SURG

## 2017-04-24 RX ORDER — PROMETHAZINE HYDROCHLORIDE 25 MG/ML
6.25 INJECTION, SOLUTION INTRAMUSCULAR; INTRAVENOUS ONCE AS NEEDED
Status: COMPLETED | OUTPATIENT
Start: 2017-04-24 | End: 2017-04-24

## 2017-04-24 RX ORDER — MIDAZOLAM HYDROCHLORIDE 1 MG/ML
1 INJECTION INTRAMUSCULAR; INTRAVENOUS
Status: DISCONTINUED | OUTPATIENT
Start: 2017-04-24 | End: 2017-04-24 | Stop reason: HOSPADM

## 2017-04-24 RX ORDER — VECURONIUM BROMIDE 1 MG/ML
INJECTION, POWDER, LYOPHILIZED, FOR SOLUTION INTRAVENOUS AS NEEDED
Status: DISCONTINUED | OUTPATIENT
Start: 2017-04-24 | End: 2017-04-24 | Stop reason: SURG

## 2017-04-24 RX ORDER — SENNA AND DOCUSATE SODIUM 50; 8.6 MG/1; MG/1
2 TABLET, FILM COATED ORAL 2 TIMES DAILY
Status: DISCONTINUED | OUTPATIENT
Start: 2017-04-24 | End: 2017-04-28 | Stop reason: HOSPADM

## 2017-04-24 RX ORDER — DOCUSATE SODIUM 100 MG/1
100 CAPSULE, LIQUID FILLED ORAL 2 TIMES DAILY PRN
Status: DISCONTINUED | OUTPATIENT
Start: 2017-04-24 | End: 2017-04-28 | Stop reason: HOSPADM

## 2017-04-24 RX ORDER — ALBUMIN, HUMAN INJ 5% 5 %
SOLUTION INTRAVENOUS CONTINUOUS PRN
Status: DISCONTINUED | OUTPATIENT
Start: 2017-04-24 | End: 2017-04-24 | Stop reason: SURG

## 2017-04-24 RX ORDER — DEXAMETHASONE SODIUM PHOSPHATE 4 MG/ML
INJECTION, SOLUTION INTRA-ARTICULAR; INTRALESIONAL; INTRAMUSCULAR; INTRAVENOUS; SOFT TISSUE AS NEEDED
Status: DISCONTINUED | OUTPATIENT
Start: 2017-04-24 | End: 2017-04-24 | Stop reason: SURG

## 2017-04-24 RX ORDER — ONDANSETRON 4 MG/1
4 TABLET, FILM COATED ORAL EVERY 6 HOURS PRN
Status: DISCONTINUED | OUTPATIENT
Start: 2017-04-24 | End: 2017-04-28 | Stop reason: HOSPADM

## 2017-04-24 RX ORDER — FENTANYL CITRATE 50 UG/ML
INJECTION, SOLUTION INTRAMUSCULAR; INTRAVENOUS AS NEEDED
Status: DISCONTINUED | OUTPATIENT
Start: 2017-04-24 | End: 2017-04-24 | Stop reason: SURG

## 2017-04-24 RX ORDER — MORPHINE SULFATE 1 MG/ML
INJECTION INTRAVENOUS CONTINUOUS
Status: DISCONTINUED | OUTPATIENT
Start: 2017-04-24 | End: 2017-04-25

## 2017-04-24 RX ORDER — FENTANYL CITRATE 50 UG/ML
25 INJECTION, SOLUTION INTRAMUSCULAR; INTRAVENOUS AS NEEDED
Status: DISCONTINUED | OUTPATIENT
Start: 2017-04-24 | End: 2017-04-24 | Stop reason: HOSPADM

## 2017-04-24 RX ORDER — ALPRAZOLAM 0.25 MG/1
0.5 TABLET ORAL ONCE AS NEEDED
Status: COMPLETED | OUTPATIENT
Start: 2017-04-24 | End: 2017-04-24

## 2017-04-24 RX ORDER — MIDAZOLAM HYDROCHLORIDE 1 MG/ML
2 INJECTION INTRAMUSCULAR; INTRAVENOUS
Status: DISCONTINUED | OUTPATIENT
Start: 2017-04-24 | End: 2017-04-24 | Stop reason: HOSPADM

## 2017-04-24 RX ORDER — GLYCOPYRROLATE 0.2 MG/ML
INJECTION INTRAMUSCULAR; INTRAVENOUS AS NEEDED
Status: DISCONTINUED | OUTPATIENT
Start: 2017-04-24 | End: 2017-04-24 | Stop reason: SURG

## 2017-04-24 RX ORDER — ONDANSETRON 2 MG/ML
4 INJECTION INTRAMUSCULAR; INTRAVENOUS EVERY 6 HOURS PRN
Status: DISCONTINUED | OUTPATIENT
Start: 2017-04-24 | End: 2017-04-28 | Stop reason: HOSPADM

## 2017-04-24 RX ORDER — CLINDAMYCIN PHOSPHATE 900 MG/50ML
900 INJECTION INTRAVENOUS ONCE
Status: COMPLETED | OUTPATIENT
Start: 2017-04-24 | End: 2017-04-24

## 2017-04-24 RX ORDER — SODIUM CHLORIDE, SODIUM LACTATE, POTASSIUM CHLORIDE, CALCIUM CHLORIDE 600; 310; 30; 20 MG/100ML; MG/100ML; MG/100ML; MG/100ML
100 INJECTION, SOLUTION INTRAVENOUS CONTINUOUS
Status: DISCONTINUED | OUTPATIENT
Start: 2017-04-24 | End: 2017-04-24

## 2017-04-24 RX ORDER — SODIUM CHLORIDE, SODIUM LACTATE, POTASSIUM CHLORIDE, CALCIUM CHLORIDE 600; 310; 30; 20 MG/100ML; MG/100ML; MG/100ML; MG/100ML
125 INJECTION, SOLUTION INTRAVENOUS CONTINUOUS
Status: DISCONTINUED | OUTPATIENT
Start: 2017-04-24 | End: 2017-04-26

## 2017-04-24 RX ORDER — SODIUM CHLORIDE 0.9 % (FLUSH) 0.9 %
1-10 SYRINGE (ML) INJECTION AS NEEDED
Status: DISCONTINUED | OUTPATIENT
Start: 2017-04-24 | End: 2017-04-24 | Stop reason: HOSPADM

## 2017-04-24 RX ORDER — ONDANSETRON 2 MG/ML
4 INJECTION INTRAMUSCULAR; INTRAVENOUS ONCE AS NEEDED
Status: DISCONTINUED | OUTPATIENT
Start: 2017-04-24 | End: 2017-04-24 | Stop reason: HOSPADM

## 2017-04-24 RX ORDER — SODIUM CHLORIDE, SODIUM LACTATE, POTASSIUM CHLORIDE, CALCIUM CHLORIDE 600; 310; 30; 20 MG/100ML; MG/100ML; MG/100ML; MG/100ML
9 INJECTION, SOLUTION INTRAVENOUS CONTINUOUS
Status: DISCONTINUED | OUTPATIENT
Start: 2017-04-24 | End: 2017-04-24

## 2017-04-24 RX ORDER — PROPOFOL 10 MG/ML
VIAL (ML) INTRAVENOUS AS NEEDED
Status: DISCONTINUED | OUTPATIENT
Start: 2017-04-24 | End: 2017-04-24 | Stop reason: SURG

## 2017-04-24 RX ORDER — POTASSIUM CHLORIDE 20 MEQ/1
40 TABLET, EXTENDED RELEASE ORAL ONCE
Status: COMPLETED | OUTPATIENT
Start: 2017-04-24 | End: 2017-04-24

## 2017-04-24 RX ORDER — LIDOCAINE HYDROCHLORIDE 20 MG/ML
INJECTION, SOLUTION INFILTRATION; PERINEURAL AS NEEDED
Status: DISCONTINUED | OUTPATIENT
Start: 2017-04-24 | End: 2017-04-24 | Stop reason: SURG

## 2017-04-24 RX ORDER — GABAPENTIN 300 MG/1
300 CAPSULE ORAL EVERY 12 HOURS SCHEDULED
Status: DISCONTINUED | OUTPATIENT
Start: 2017-04-24 | End: 2017-04-25

## 2017-04-24 RX ORDER — LIDOCAINE HYDROCHLORIDE 10 MG/ML
0.3 INJECTION, SOLUTION EPIDURAL; INFILTRATION; INTRACAUDAL; PERINEURAL ONCE
Status: COMPLETED | OUTPATIENT
Start: 2017-04-24 | End: 2017-04-24

## 2017-04-24 RX ORDER — PROMETHAZINE HYDROCHLORIDE 25 MG/1
25 TABLET ORAL ONCE AS NEEDED
Status: COMPLETED | OUTPATIENT
Start: 2017-04-24 | End: 2017-04-24

## 2017-04-24 RX ORDER — HYDROMORPHONE HCL 110MG/55ML
PATIENT CONTROLLED ANALGESIA SYRINGE INTRAVENOUS
Status: DISPENSED
Start: 2017-04-24 | End: 2017-04-24

## 2017-04-24 RX ORDER — ONDANSETRON 4 MG/1
4 TABLET, ORALLY DISINTEGRATING ORAL EVERY 6 HOURS PRN
Status: DISCONTINUED | OUTPATIENT
Start: 2017-04-24 | End: 2017-04-28 | Stop reason: HOSPADM

## 2017-04-24 RX ORDER — PANTOPRAZOLE SODIUM 40 MG/10ML
40 INJECTION, POWDER, LYOPHILIZED, FOR SOLUTION INTRAVENOUS
Status: DISCONTINUED | OUTPATIENT
Start: 2017-04-24 | End: 2017-04-25

## 2017-04-24 RX ORDER — HYDROMORPHONE HCL 110MG/55ML
0.5 PATIENT CONTROLLED ANALGESIA SYRINGE INTRAVENOUS AS NEEDED
Status: DISCONTINUED | OUTPATIENT
Start: 2017-04-24 | End: 2017-04-24 | Stop reason: HOSPADM

## 2017-04-24 RX ORDER — CLINDAMYCIN PHOSPHATE 900 MG/50ML
900 INJECTION INTRAVENOUS EVERY 8 HOURS
Status: COMPLETED | OUTPATIENT
Start: 2017-04-24 | End: 2017-04-25

## 2017-04-24 RX ADMIN — HYDROMORPHONE HYDROCHLORIDE 0.5 MG: 2 INJECTION, SOLUTION INTRAMUSCULAR; INTRAVENOUS; SUBCUTANEOUS at 11:50

## 2017-04-24 RX ADMIN — MIDAZOLAM HYDROCHLORIDE 1 MG: 1 INJECTION, SOLUTION INTRAMUSCULAR; INTRAVENOUS at 07:48

## 2017-04-24 RX ADMIN — ALPRAZOLAM 0.5 MG: 0.25 TABLET ORAL at 22:22

## 2017-04-24 RX ADMIN — POTASSIUM CHLORIDE 40 MEQ: 20 TABLET, EXTENDED RELEASE ORAL at 16:06

## 2017-04-24 RX ADMIN — DOCUSATE SODIUM AND SENNOSIDES 2 TABLET: 8.6; 5 TABLET, FILM COATED ORAL at 19:18

## 2017-04-24 RX ADMIN — CLINDAMYCIN PHOSPHATE 900 MG: 18 INJECTION, SOLUTION INTRAVENOUS at 19:18

## 2017-04-24 RX ADMIN — LIDOCAINE HYDROCHLORIDE 80 MG: 20 INJECTION, SOLUTION INFILTRATION; PERINEURAL at 10:46

## 2017-04-24 RX ADMIN — ALBUMIN HUMAN: 0.05 INJECTION, SOLUTION INTRAVENOUS at 10:07

## 2017-04-24 RX ADMIN — KETAMINE HYDROCHLORIDE 20 MG: 100 INJECTION INTRAMUSCULAR; INTRAVENOUS at 08:31

## 2017-04-24 RX ADMIN — GABAPENTIN 300 MG: 300 CAPSULE ORAL at 22:34

## 2017-04-24 RX ADMIN — SODIUM CHLORIDE, POTASSIUM CHLORIDE, SODIUM LACTATE AND CALCIUM CHLORIDE 125 ML/HR: 600; 310; 30; 20 INJECTION, SOLUTION INTRAVENOUS at 21:23

## 2017-04-24 RX ADMIN — PANTOPRAZOLE SODIUM 40 MG: 40 INJECTION, POWDER, FOR SOLUTION INTRAVENOUS at 13:36

## 2017-04-24 RX ADMIN — KETAMINE HYDROCHLORIDE 10 MG: 100 INJECTION INTRAMUSCULAR; INTRAVENOUS at 10:07

## 2017-04-24 RX ADMIN — SUCCINYLCHOLINE CHLORIDE 120 MG: 20 INJECTION, SOLUTION INTRAMUSCULAR; INTRAVENOUS at 08:04

## 2017-04-24 RX ADMIN — MORPHINE SULFATE: 1 INJECTION INTRAVENOUS at 12:42

## 2017-04-24 RX ADMIN — MORPHINE SULFATE: 1 INJECTION INTRAVENOUS at 22:18

## 2017-04-24 RX ADMIN — LIDOCAINE HYDROCHLORIDE 0.1 ML: 10 INJECTION, SOLUTION EPIDURAL; INFILTRATION; INTRACAUDAL; PERINEURAL at 07:22

## 2017-04-24 RX ADMIN — VECURONIUM BROMIDE FOR INJECTION 2 MG: 1 INJECTION, POWDER, LYOPHILIZED, FOR SOLUTION INTRAVENOUS at 09:11

## 2017-04-24 RX ADMIN — FAMOTIDINE 20 MG: 10 INJECTION, SOLUTION INTRAVENOUS at 07:34

## 2017-04-24 RX ADMIN — DEXAMETHASONE SODIUM PHOSPHATE 8 MG: 4 INJECTION, SOLUTION INTRAMUSCULAR; INTRAVENOUS at 08:49

## 2017-04-24 RX ADMIN — VECURONIUM BROMIDE FOR INJECTION 5 MG: 1 INJECTION, POWDER, LYOPHILIZED, FOR SOLUTION INTRAVENOUS at 08:11

## 2017-04-24 RX ADMIN — KETOROLAC TROMETHAMINE 30 MG: 30 INJECTION, SOLUTION INTRAMUSCULAR; INTRAVENOUS at 10:03

## 2017-04-24 RX ADMIN — GLYCOPYRROLATE 0.4 MG: 0.2 INJECTION INTRAMUSCULAR; INTRAVENOUS at 10:37

## 2017-04-24 RX ADMIN — ONDANSETRON 4 MG: 2 INJECTION, SOLUTION INTRAMUSCULAR; INTRAVENOUS at 07:34

## 2017-04-24 RX ADMIN — PROPOFOL 200 MG: 10 INJECTION, EMULSION INTRAVENOUS at 08:04

## 2017-04-24 RX ADMIN — NEOSTIGMINE METHYLSULFATE 3 MG: 1 INJECTION INTRAVENOUS at 10:37

## 2017-04-24 RX ADMIN — FENTANYL CITRATE 50 MCG: 50 INJECTION, SOLUTION INTRAMUSCULAR; INTRAVENOUS at 08:04

## 2017-04-24 RX ADMIN — FENTANYL CITRATE 50 MCG: 50 INJECTION, SOLUTION INTRAMUSCULAR; INTRAVENOUS at 08:16

## 2017-04-24 RX ADMIN — VECURONIUM BROMIDE FOR INJECTION 2 MG: 1 INJECTION, POWDER, LYOPHILIZED, FOR SOLUTION INTRAVENOUS at 08:14

## 2017-04-24 RX ADMIN — DOCUSATE SODIUM AND SENNOSIDES 2 TABLET: 8.6; 5 TABLET, FILM COATED ORAL at 13:36

## 2017-04-24 RX ADMIN — PROMETHAZINE HYDROCHLORIDE 6.25 MG: 25 INJECTION INTRAMUSCULAR; INTRAVENOUS at 11:30

## 2017-04-24 RX ADMIN — GLYCOPYRROLATE 0.1 MG: 0.2 INJECTION INTRAMUSCULAR; INTRAVENOUS at 07:59

## 2017-04-24 RX ADMIN — HYDROMORPHONE HYDROCHLORIDE 0.5 MG: 2 INJECTION, SOLUTION INTRAMUSCULAR; INTRAVENOUS; SUBCUTANEOUS at 11:29

## 2017-04-24 RX ADMIN — SODIUM CHLORIDE, POTASSIUM CHLORIDE, SODIUM LACTATE AND CALCIUM CHLORIDE 125 ML/HR: 600; 310; 30; 20 INJECTION, SOLUTION INTRAVENOUS at 12:42

## 2017-04-24 RX ADMIN — LIDOCAINE HYDROCHLORIDE 120 MG: 20 INJECTION, SOLUTION INFILTRATION; PERINEURAL at 08:04

## 2017-04-24 RX ADMIN — FENTANYL CITRATE 50 MCG: 50 INJECTION, SOLUTION INTRAMUSCULAR; INTRAVENOUS at 08:51

## 2017-04-24 RX ADMIN — FENTANYL CITRATE 50 MCG: 50 INJECTION, SOLUTION INTRAMUSCULAR; INTRAVENOUS at 11:19

## 2017-04-24 RX ADMIN — KETAMINE HYDROCHLORIDE 20 MG: 100 INJECTION INTRAMUSCULAR; INTRAVENOUS at 08:09

## 2017-04-24 RX ADMIN — CLINDAMYCIN IN 5 PERCENT DEXTROSE 900 MG: 18 INJECTION, SOLUTION INTRAVENOUS at 07:58

## 2017-04-24 RX ADMIN — ALBUMIN HUMAN: 0.05 INJECTION, SOLUTION INTRAVENOUS at 09:03

## 2017-04-24 RX ADMIN — KETAMINE HYDROCHLORIDE 10 MG: 100 INJECTION INTRAMUSCULAR; INTRAVENOUS at 09:43

## 2017-04-24 RX ADMIN — MIDAZOLAM HYDROCHLORIDE 1 MG: 1 INJECTION, SOLUTION INTRAMUSCULAR; INTRAVENOUS at 07:45

## 2017-04-24 RX ADMIN — FENTANYL CITRATE 50 MCG: 50 INJECTION, SOLUTION INTRAMUSCULAR; INTRAVENOUS at 08:34

## 2017-04-24 RX ADMIN — KETAMINE HYDROCHLORIDE 10 MG: 100 INJECTION INTRAMUSCULAR; INTRAVENOUS at 08:04

## 2017-04-24 RX ADMIN — SODIUM CHLORIDE, POTASSIUM CHLORIDE, SODIUM LACTATE AND CALCIUM CHLORIDE 9 ML/HR: 600; 310; 30; 20 INJECTION, SOLUTION INTRAVENOUS at 07:20

## 2017-04-24 RX ADMIN — VECURONIUM BROMIDE FOR INJECTION 1 MG: 1 INJECTION, POWDER, LYOPHILIZED, FOR SOLUTION INTRAVENOUS at 09:49

## 2017-04-24 NOTE — ANESTHESIA POSTPROCEDURE EVALUATION
Patient: Alyssa Umana    Procedure Summary     Date Anesthesia Start Anesthesia Stop Room / Location    04/24/17 0758 1114 [facility] / [facility]       Procedure Diagnosis Surgeon Provider    VENTRAL / INCISIONAL HERNIA WITH incarcerated incisional hernia repair, COMPONENT SEPARATION, advancement of bilateral myofascial advancement flaps, lysis of adhesions (N/A Abdomen) Ventral hernia without obstruction or gangrene; Incisional hernia, without obstruction or gangrene; Abdominal adhesions  (Ventral hernia without obstruction or gangrene [K43.9]; Incisional hernia, without obstruction or gangrene [K43.2]) MD Michelle Rockwell CRNA          Anesthesia Type: general  Last vitals  /88 (04/24/17 1148)    Temp      Pulse 84 (04/24/17 1148)   Resp      SpO2 99 % (04/24/17 1148)      Post Anesthesia Care and Evaluation    Patient location during evaluation: PACU  Patient participation: complete - patient participated  Level of consciousness: awake and alert  Pain management: adequate  Airway patency: patent  Anesthetic complications: No anesthetic complications    Cardiovascular status: acceptable and hemodynamically stable  Respiratory status: acceptable  Hydration status: acceptable

## 2017-04-24 NOTE — ANESTHESIA PREPROCEDURE EVALUATION
Anesthesia Evaluation     Patient summary reviewed and Nursing notes reviewed   no history of anesthetic complications:  NPO Status: > 8 hours   Airway   Mallampati: III  TM distance: >3 FB  Neck ROM: full  possible difficult intubation  Dental - normal exam     Pulmonary - normal exam    breath sounds clear to auscultation  (+) pneumonia resolved ,   Sleep apnea: snores heavily.  Cardiovascular - normal exam    ECG reviewed  Rhythm: regular  Rate: normal    (+) hypertension poorly controlled,     ROS comment: EKG @ Dr Soliman office 4/10/17, ok per pt    Neuro/Psych  (+) headaches,    GI/Hepatic/Renal/Endo    (+) morbid obesity, diabetes mellitus (not Rx in 10 yrs. accu check 91) type 2,     Musculoskeletal     (+) chronic pain,   Abdominal   (+) obese,    Substance History - negative use     OB/GYN          Other   (+) arthritis     ROS/Med Hx Other: Very anxious  / poor venous access  / pt OK with toradol                          Anesthesia Plan    ASA 3     general   (Discussed poss post op ventilation)  intravenous induction   Anesthetic plan and risks discussed with patient.  Use of blood products discussed with patient  Consented to blood products.

## 2017-04-24 NOTE — ANESTHESIA PROCEDURE NOTES
Airway  Urgency: elective    Airway not difficult    General Information and Staff    Patient location during procedure: OR    Indications and Patient Condition  Indications for airway management: airway protection    Preoxygenated: yes  MILS maintained throughout  Mask difficulty assessment: 1 - vent by mask    Final Airway Details  Final airway type: endotracheal airway      Successful airway: ETT  Cuffed: yes   Successful intubation technique: direct laryngoscopy  Facilitating devices/methods: intubating stylet  Endotracheal tube insertion site: oral  Blade: Thakur  Blade size: #3  ETT size: 7.5 mm  Cormack-Lehane Classification: grade I - full view of glottis (W PRESSURE)  Placement verified by: chest auscultation and capnometry   Measured from: lips  ETT to lips (cm): 21  Number of attempts at approach: 1

## 2017-04-25 LAB
ANION GAP SERPL CALCULATED.3IONS-SCNC: 12.6 MMOL/L
BASOPHILS # BLD AUTO: ABNORMAL 10*3/MM3 (ref 0–0.2)
BASOPHILS NFR BLD AUTO: ABNORMAL % (ref 0–2)
BUN BLD-MCNC: 13 MG/DL (ref 6–20)
BUN/CREAT SERPL: 12.4 (ref 7–25)
CALCIUM SPEC-SCNC: 8.4 MG/DL (ref 8.6–10.5)
CHLORIDE SERPL-SCNC: 98 MMOL/L (ref 98–107)
CO2 SERPL-SCNC: 26.4 MMOL/L (ref 22–29)
CREAT BLD-MCNC: 1.05 MG/DL (ref 0.57–1)
DEPRECATED RDW RBC AUTO: 47.4 FL (ref 37–54)
EOSINOPHIL # BLD AUTO: ABNORMAL 10*3/MM3 (ref 0.1–0.3)
EOSINOPHIL NFR BLD AUTO: ABNORMAL % (ref 0–4)
ERYTHROCYTE [DISTWIDTH] IN BLOOD BY AUTOMATED COUNT: 14.2 % (ref 11.5–14.5)
GFR SERPL CREATININE-BSD FRML MDRD: 68 ML/MIN/1.73
GLUCOSE BLD-MCNC: 105 MG/DL (ref 65–99)
HCT VFR BLD AUTO: 33.8 % (ref 37–47)
HGB BLD-MCNC: 11.7 G/DL (ref 12–16)
LYMPHOCYTES # BLD AUTO: 1.1 10*3/MM3 (ref 0.6–4.8)
LYMPHOCYTES NFR BLD AUTO: 17.3 % (ref 20–45)
MCH RBC QN AUTO: 30.1 PG (ref 27–31)
MCHC RBC AUTO-ENTMCNC: 34.6 G/DL (ref 31–37)
MCV RBC AUTO: 86.9 FL (ref 81–99)
MONOCYTES # BLD AUTO: 0.8 10*3/MM3 (ref 0–1)
MONOCYTES NFR BLD AUTO: 6.6 % (ref 3–8)
NEUTROPHILS # BLD AUTO: 5 10*3/MM3 (ref 1.5–8.3)
NEUTROPHILS NFR BLD AUTO: 76.1 % (ref 45–70)
PLATELET # BLD AUTO: 293 10*3/MM3 (ref 140–500)
PMV BLD AUTO: 9.5 FL (ref 7.4–10.4)
POTASSIUM BLD-SCNC: 4.1 MMOL/L (ref 3.5–5.2)
RBC # BLD AUTO: 3.89 10*6/MM3 (ref 4.2–5.4)
SODIUM BLD-SCNC: 137 MMOL/L (ref 136–145)
WBC NRBC COR # BLD: 9.3 10*3/MM3 (ref 4.8–10.8)

## 2017-04-25 PROCEDURE — 99232 SBSQ HOSP IP/OBS MODERATE 35: CPT | Performed by: NURSE PRACTITIONER

## 2017-04-25 PROCEDURE — 85025 COMPLETE CBC W/AUTO DIFF WBC: CPT | Performed by: SURGERY

## 2017-04-25 PROCEDURE — 80048 BASIC METABOLIC PNL TOTAL CA: CPT | Performed by: SURGERY

## 2017-04-25 PROCEDURE — 25010000002 HYDROMORPHONE PER 4 MG: Performed by: SURGERY

## 2017-04-25 PROCEDURE — 99024 POSTOP FOLLOW-UP VISIT: CPT | Performed by: SURGERY

## 2017-04-25 PROCEDURE — 94799 UNLISTED PULMONARY SVC/PX: CPT

## 2017-04-25 PROCEDURE — 25010000002 ENOXAPARIN PER 10 MG: Performed by: SURGERY

## 2017-04-25 PROCEDURE — 25010000002 HYDROMORPHONE PER 4 MG

## 2017-04-25 PROCEDURE — 25010000002 ONDANSETRON PER 1 MG: Performed by: SURGERY

## 2017-04-25 RX ORDER — CLINDAMYCIN PHOSPHATE 900 MG/50ML
900 INJECTION INTRAVENOUS EVERY 8 HOURS
Status: COMPLETED | OUTPATIENT
Start: 2017-04-25 | End: 2017-04-26

## 2017-04-25 RX ORDER — HYDROMORPHONE HCL 110MG/55ML
PATIENT CONTROLLED ANALGESIA SYRINGE INTRAVENOUS
Status: COMPLETED
Start: 2017-04-25 | End: 2017-04-25

## 2017-04-25 RX ORDER — HYDROMORPHONE HCL 110MG/55ML
2 PATIENT CONTROLLED ANALGESIA SYRINGE INTRAVENOUS EVERY 4 HOURS PRN
Status: DISCONTINUED | OUTPATIENT
Start: 2017-04-25 | End: 2017-04-28 | Stop reason: HOSPADM

## 2017-04-25 RX ORDER — ALPRAZOLAM 0.25 MG/1
0.25 TABLET ORAL 3 TIMES DAILY PRN
Status: DISCONTINUED | OUTPATIENT
Start: 2017-04-25 | End: 2017-04-28 | Stop reason: HOSPADM

## 2017-04-25 RX ORDER — PANTOPRAZOLE SODIUM 40 MG/1
40 TABLET, DELAYED RELEASE ORAL
Status: DISCONTINUED | OUTPATIENT
Start: 2017-04-26 | End: 2017-04-28 | Stop reason: HOSPADM

## 2017-04-25 RX ORDER — AMITRIPTYLINE HYDROCHLORIDE 25 MG/1
100 TABLET, FILM COATED ORAL NIGHTLY
Status: DISCONTINUED | OUTPATIENT
Start: 2017-04-25 | End: 2017-04-28 | Stop reason: HOSPADM

## 2017-04-25 RX ORDER — PROMETHAZINE HYDROCHLORIDE 6.25 MG/5ML
12.5 SYRUP ORAL EVERY 6 HOURS PRN
Status: DISCONTINUED | OUTPATIENT
Start: 2017-04-25 | End: 2017-04-28 | Stop reason: HOSPADM

## 2017-04-25 RX ORDER — OXYCODONE HYDROCHLORIDE 5 MG/1
15 TABLET ORAL 3 TIMES DAILY
Status: DISCONTINUED | OUTPATIENT
Start: 2017-04-25 | End: 2017-04-28 | Stop reason: HOSPADM

## 2017-04-25 RX ORDER — PROMETHAZINE HYDROCHLORIDE 25 MG/ML
12.5 INJECTION, SOLUTION INTRAMUSCULAR; INTRAVENOUS EVERY 6 HOURS PRN
Status: DISCONTINUED | OUTPATIENT
Start: 2017-04-25 | End: 2017-04-28 | Stop reason: HOSPADM

## 2017-04-25 RX ORDER — BUTALBITAL, ACETAMINOPHEN AND CAFFEINE 50; 325; 40 MG/1; MG/1; MG/1
1 TABLET ORAL EVERY 4 HOURS PRN
Status: DISCONTINUED | OUTPATIENT
Start: 2017-04-25 | End: 2017-04-28 | Stop reason: HOSPADM

## 2017-04-25 RX ORDER — PROMETHAZINE HYDROCHLORIDE 12.5 MG/1
12.5 TABLET ORAL EVERY 6 HOURS PRN
Status: DISCONTINUED | OUTPATIENT
Start: 2017-04-25 | End: 2017-04-28 | Stop reason: HOSPADM

## 2017-04-25 RX ORDER — PROMETHAZINE HYDROCHLORIDE 12.5 MG/1
12.5 SUPPOSITORY RECTAL EVERY 6 HOURS PRN
Status: DISCONTINUED | OUTPATIENT
Start: 2017-04-25 | End: 2017-04-28 | Stop reason: HOSPADM

## 2017-04-25 RX ADMIN — HYDROMORPHONE HYDROCHLORIDE 2 MG: 2 INJECTION, SOLUTION INTRAMUSCULAR; INTRAVENOUS; SUBCUTANEOUS at 04:55

## 2017-04-25 RX ADMIN — SODIUM CHLORIDE, POTASSIUM CHLORIDE, SODIUM LACTATE AND CALCIUM CHLORIDE 125 ML/HR: 600; 310; 30; 20 INJECTION, SOLUTION INTRAVENOUS at 09:03

## 2017-04-25 RX ADMIN — PANTOPRAZOLE SODIUM 40 MG: 40 INJECTION, POWDER, FOR SOLUTION INTRAVENOUS at 09:04

## 2017-04-25 RX ADMIN — ONDANSETRON 4 MG: 2 INJECTION, SOLUTION INTRAMUSCULAR; INTRAVENOUS at 10:09

## 2017-04-25 RX ADMIN — DOCUSATE SODIUM AND SENNOSIDES 2 TABLET: 8.6; 5 TABLET, FILM COATED ORAL at 19:10

## 2017-04-25 RX ADMIN — GABAPENTIN 300 MG: 300 CAPSULE ORAL at 09:03

## 2017-04-25 RX ADMIN — CLINDAMYCIN PHOSPHATE 900 MG: 18 INJECTION, SOLUTION INTRAVENOUS at 01:14

## 2017-04-25 RX ADMIN — ONDANSETRON 4 MG: 2 INJECTION, SOLUTION INTRAMUSCULAR; INTRAVENOUS at 00:35

## 2017-04-25 RX ADMIN — HYDROMORPHONE HYDROCHLORIDE 2 MG: 2 INJECTION, SOLUTION INTRAMUSCULAR; INTRAVENOUS; SUBCUTANEOUS at 19:10

## 2017-04-25 RX ADMIN — DOCUSATE SODIUM AND SENNOSIDES 2 TABLET: 8.6; 5 TABLET, FILM COATED ORAL at 09:03

## 2017-04-25 RX ADMIN — OXYCODONE HYDROCHLORIDE 15 MG: 5 TABLET ORAL at 12:35

## 2017-04-25 RX ADMIN — ALPRAZOLAM 0.25 MG: 0.25 TABLET ORAL at 11:45

## 2017-04-25 RX ADMIN — OXYCODONE HYDROCHLORIDE 15 MG: 5 TABLET ORAL at 21:52

## 2017-04-25 RX ADMIN — CLINDAMYCIN PHOSPHATE 900 MG: 18 INJECTION, SOLUTION INTRAVENOUS at 14:37

## 2017-04-25 RX ADMIN — SERTRALINE HYDROCHLORIDE 100 MG: 50 TABLET ORAL at 11:43

## 2017-04-25 RX ADMIN — AMITRIPTYLINE HYDROCHLORIDE 100 MG: 25 TABLET, FILM COATED ORAL at 21:52

## 2017-04-25 RX ADMIN — HYDROMORPHONE HYDROCHLORIDE 2 MG: 2 INJECTION, SOLUTION INTRAMUSCULAR; INTRAVENOUS; SUBCUTANEOUS at 08:59

## 2017-04-25 RX ADMIN — SODIUM CHLORIDE, POTASSIUM CHLORIDE, SODIUM LACTATE AND CALCIUM CHLORIDE 125 ML/HR: 600; 310; 30; 20 INJECTION, SOLUTION INTRAVENOUS at 21:52

## 2017-04-25 RX ADMIN — ENOXAPARIN SODIUM 40 MG: 40 INJECTION SUBCUTANEOUS at 19:10

## 2017-04-25 RX ADMIN — ALPRAZOLAM 0.25 MG: 0.25 TABLET ORAL at 21:52

## 2017-04-25 RX ADMIN — CLINDAMYCIN PHOSPHATE 900 MG: 18 INJECTION, SOLUTION INTRAVENOUS at 21:52

## 2017-04-26 ENCOUNTER — APPOINTMENT (OUTPATIENT)
Dept: GENERAL RADIOLOGY | Facility: HOSPITAL | Age: 47
End: 2017-04-26

## 2017-04-26 ENCOUNTER — APPOINTMENT (OUTPATIENT)
Dept: ULTRASOUND IMAGING | Facility: HOSPITAL | Age: 47
End: 2017-04-26

## 2017-04-26 LAB
ANION GAP SERPL CALCULATED.3IONS-SCNC: 16.1 MMOL/L
BACTERIA UR QL AUTO: ABNORMAL /HPF
BASOPHILS # BLD AUTO: 0.03 10*3/MM3 (ref 0–0.2)
BASOPHILS NFR BLD AUTO: 0.4 % (ref 0–2)
BILIRUB UR QL STRIP: NEGATIVE
BUN BLD-MCNC: 11 MG/DL (ref 6–20)
BUN/CREAT SERPL: 12.2 (ref 7–25)
CALCIUM SPEC-SCNC: 8.3 MG/DL (ref 8.6–10.5)
CHLORIDE SERPL-SCNC: 98 MMOL/L (ref 98–107)
CLARITY UR: CLEAR
CO2 SERPL-SCNC: 19.9 MMOL/L (ref 22–29)
COLOR UR: YELLOW
CREAT BLD-MCNC: 0.9 MG/DL (ref 0.57–1)
DEPRECATED RDW RBC AUTO: 42.7 FL (ref 37–54)
EOSINOPHIL # BLD AUTO: 0.08 10*3/MM3 (ref 0.1–0.3)
EOSINOPHIL NFR BLD AUTO: 1 % (ref 0–4)
ERYTHROCYTE [DISTWIDTH] IN BLOOD BY AUTOMATED COUNT: 13.8 % (ref 11.5–14.5)
GFR SERPL CREATININE-BSD FRML MDRD: 81 ML/MIN/1.73
GLUCOSE BLD-MCNC: 109 MG/DL (ref 65–99)
GLUCOSE UR STRIP-MCNC: NEGATIVE MG/DL
HCT VFR BLD AUTO: 34 % (ref 37–47)
HGB BLD-MCNC: 11.6 G/DL (ref 12–16)
HGB UR QL STRIP.AUTO: ABNORMAL
HYALINE CASTS UR QL AUTO: ABNORMAL /LPF
IMM GRANULOCYTES # BLD: 0.11 10*3/MM3 (ref 0–0.03)
IMM GRANULOCYTES NFR BLD: 1.3 % (ref 0–0.5)
KETONES UR QL STRIP: NEGATIVE
LAB AP CASE REPORT: NORMAL
LEUKOCYTE ESTERASE UR QL STRIP.AUTO: NEGATIVE
LYMPHOCYTES # BLD AUTO: 2.15 10*3/MM3 (ref 0.6–4.8)
LYMPHOCYTES NFR BLD AUTO: 25.6 % (ref 20–45)
Lab: NORMAL
MCH RBC QN AUTO: 28.9 PG (ref 27–31)
MCHC RBC AUTO-ENTMCNC: 34.1 G/DL (ref 31–37)
MCV RBC AUTO: 84.6 FL (ref 81–99)
MONOCYTES # BLD AUTO: 0.79 10*3/MM3 (ref 0–1)
MONOCYTES NFR BLD AUTO: 9.4 % (ref 3–8)
NEUTROPHILS # BLD AUTO: 5.25 10*3/MM3 (ref 1.5–8.3)
NEUTROPHILS NFR BLD AUTO: 62.3 % (ref 45–70)
NITRITE UR QL STRIP: NEGATIVE
NRBC BLD MANUAL-RTO: 0 /100 WBC (ref 0–0)
PATH REPORT.FINAL DX SPEC: NORMAL
PH UR STRIP.AUTO: 6 [PH] (ref 4.5–8)
PLATELET # BLD AUTO: 202 10*3/MM3 (ref 140–500)
PMV BLD AUTO: 9.7 FL (ref 7.4–10.4)
POTASSIUM BLD-SCNC: 3.9 MMOL/L (ref 3.5–5.2)
PROT UR QL STRIP: NEGATIVE
RBC # BLD AUTO: 4.02 10*6/MM3 (ref 4.2–5.4)
RBC # UR: ABNORMAL /HPF
RBC MORPH BLD: NORMAL
REF LAB TEST METHOD: ABNORMAL
SMALL PLATELETS BLD QL SMEAR: ADEQUATE
SODIUM BLD-SCNC: 134 MMOL/L (ref 136–145)
SP GR UR STRIP: 1.02 (ref 1–1.03)
SQUAMOUS #/AREA URNS HPF: ABNORMAL /HPF
UROBILINOGEN UR QL STRIP: ABNORMAL
WBC MORPH BLD: NORMAL
WBC NRBC COR # BLD: 8.41 10*3/MM3 (ref 4.8–10.8)
WBC UR QL AUTO: ABNORMAL /HPF

## 2017-04-26 PROCEDURE — 87086 URINE CULTURE/COLONY COUNT: CPT | Performed by: NURSE PRACTITIONER

## 2017-04-26 PROCEDURE — 94799 UNLISTED PULMONARY SVC/PX: CPT

## 2017-04-26 PROCEDURE — 99024 POSTOP FOLLOW-UP VISIT: CPT | Performed by: SURGERY

## 2017-04-26 PROCEDURE — 80048 BASIC METABOLIC PNL TOTAL CA: CPT | Performed by: SURGERY

## 2017-04-26 PROCEDURE — 81001 URINALYSIS AUTO W/SCOPE: CPT | Performed by: NURSE PRACTITIONER

## 2017-04-26 PROCEDURE — 99232 SBSQ HOSP IP/OBS MODERATE 35: CPT | Performed by: NURSE PRACTITIONER

## 2017-04-26 PROCEDURE — 25010000002 ENOXAPARIN PER 10 MG: Performed by: SURGERY

## 2017-04-26 PROCEDURE — 25010000002 ONDANSETRON PER 1 MG: Performed by: SURGERY

## 2017-04-26 PROCEDURE — 25010000002 LEVOFLOXACIN PER 250 MG: Performed by: SURGERY

## 2017-04-26 PROCEDURE — 25010000002 HYDROMORPHONE PER 4 MG: Performed by: SURGERY

## 2017-04-26 PROCEDURE — 85025 COMPLETE CBC W/AUTO DIFF WBC: CPT | Performed by: SURGERY

## 2017-04-26 PROCEDURE — 93970 EXTREMITY STUDY: CPT

## 2017-04-26 PROCEDURE — 71010 HC CHEST PA OR AP: CPT

## 2017-04-26 PROCEDURE — 85007 BL SMEAR W/DIFF WBC COUNT: CPT | Performed by: SURGERY

## 2017-04-26 RX ORDER — LEVOFLOXACIN 5 MG/ML
500 INJECTION, SOLUTION INTRAVENOUS EVERY 24 HOURS
Status: DISCONTINUED | OUTPATIENT
Start: 2017-04-26 | End: 2017-04-28 | Stop reason: HOSPADM

## 2017-04-26 RX ORDER — FLUCONAZOLE 100 MG/1
200 TABLET ORAL EVERY 24 HOURS
Status: DISCONTINUED | OUTPATIENT
Start: 2017-04-26 | End: 2017-04-28 | Stop reason: HOSPADM

## 2017-04-26 RX ADMIN — HYDROMORPHONE HYDROCHLORIDE 2 MG: 2 INJECTION, SOLUTION INTRAMUSCULAR; INTRAVENOUS; SUBCUTANEOUS at 05:04

## 2017-04-26 RX ADMIN — DOCUSATE SODIUM AND SENNOSIDES 2 TABLET: 8.6; 5 TABLET, FILM COATED ORAL at 18:50

## 2017-04-26 RX ADMIN — ENOXAPARIN SODIUM 40 MG: 40 INJECTION SUBCUTANEOUS at 11:07

## 2017-04-26 RX ADMIN — BUTALBITAL, ACETAMINOPHEN, AND CAFFEINE 1 TABLET: 50; 325; 40 TABLET ORAL at 23:39

## 2017-04-26 RX ADMIN — SODIUM CHLORIDE, POTASSIUM CHLORIDE, SODIUM LACTATE AND CALCIUM CHLORIDE 125 ML/HR: 600; 310; 30; 20 INJECTION, SOLUTION INTRAVENOUS at 06:40

## 2017-04-26 RX ADMIN — OXYCODONE HYDROCHLORIDE 15 MG: 5 TABLET ORAL at 14:16

## 2017-04-26 RX ADMIN — MICONAZOLE NITRATE: 20 CREAM TOPICAL at 20:19

## 2017-04-26 RX ADMIN — OXYCODONE HYDROCHLORIDE 15 MG: 5 TABLET ORAL at 20:18

## 2017-04-26 RX ADMIN — AMITRIPTYLINE HYDROCHLORIDE 100 MG: 25 TABLET, FILM COATED ORAL at 20:18

## 2017-04-26 RX ADMIN — ALPRAZOLAM 0.25 MG: 0.25 TABLET ORAL at 23:39

## 2017-04-26 RX ADMIN — HYDROMORPHONE HYDROCHLORIDE 2 MG: 2 INJECTION, SOLUTION INTRAMUSCULAR; INTRAVENOUS; SUBCUTANEOUS at 22:25

## 2017-04-26 RX ADMIN — PANTOPRAZOLE SODIUM 40 MG: 40 TABLET, DELAYED RELEASE ORAL at 05:07

## 2017-04-26 RX ADMIN — OXYCODONE HYDROCHLORIDE 15 MG: 5 TABLET ORAL at 06:40

## 2017-04-26 RX ADMIN — DOCUSATE SODIUM AND SENNOSIDES 2 TABLET: 8.6; 5 TABLET, FILM COATED ORAL at 11:08

## 2017-04-26 RX ADMIN — SERTRALINE HYDROCHLORIDE 100 MG: 50 TABLET ORAL at 11:08

## 2017-04-26 RX ADMIN — HYDROMORPHONE HYDROCHLORIDE 2 MG: 2 INJECTION, SOLUTION INTRAMUSCULAR; INTRAVENOUS; SUBCUTANEOUS at 11:06

## 2017-04-26 RX ADMIN — CLINDAMYCIN PHOSPHATE 900 MG: 18 INJECTION, SOLUTION INTRAVENOUS at 06:40

## 2017-04-26 RX ADMIN — LEVOFLOXACIN 500 MG: 500 INJECTION, SOLUTION INTRAVENOUS at 11:06

## 2017-04-26 RX ADMIN — ONDANSETRON 4 MG: 2 INJECTION, SOLUTION INTRAMUSCULAR; INTRAVENOUS at 23:43

## 2017-04-26 RX ADMIN — FLUCONAZOLE 200 MG: 100 TABLET ORAL at 18:50

## 2017-04-27 LAB
ANION GAP SERPL CALCULATED.3IONS-SCNC: 12.2 MMOL/L
BASOPHILS # BLD AUTO: 0.03 10*3/MM3 (ref 0–0.2)
BASOPHILS NFR BLD AUTO: 0.5 % (ref 0–2)
BUN BLD-MCNC: 9 MG/DL (ref 6–20)
BUN/CREAT SERPL: 10.2 (ref 7–25)
CALCIUM SPEC-SCNC: 8.2 MG/DL (ref 8.6–10.5)
CHLORIDE SERPL-SCNC: 98 MMOL/L (ref 98–107)
CO2 SERPL-SCNC: 28.8 MMOL/L (ref 22–29)
CREAT BLD-MCNC: 0.88 MG/DL (ref 0.57–1)
DEPRECATED RDW RBC AUTO: 42.8 FL (ref 37–54)
EOSINOPHIL # BLD AUTO: 0.06 10*3/MM3 (ref 0.1–0.3)
EOSINOPHIL NFR BLD AUTO: 1 % (ref 0–4)
ERYTHROCYTE [DISTWIDTH] IN BLOOD BY AUTOMATED COUNT: 13.6 % (ref 11.5–14.5)
GFR SERPL CREATININE-BSD FRML MDRD: 83 ML/MIN/1.73
GLUCOSE BLD-MCNC: 102 MG/DL (ref 65–99)
HCT VFR BLD AUTO: 31.2 % (ref 37–47)
HGB BLD-MCNC: 10.3 G/DL (ref 12–16)
IMM GRANULOCYTES # BLD: 0.02 10*3/MM3 (ref 0–0.03)
IMM GRANULOCYTES NFR BLD: 0.3 % (ref 0–0.5)
LYMPHOCYTES # BLD AUTO: 1.98 10*3/MM3 (ref 0.6–4.8)
LYMPHOCYTES NFR BLD AUTO: 32.1 % (ref 20–45)
MCH RBC QN AUTO: 28.1 PG (ref 27–31)
MCHC RBC AUTO-ENTMCNC: 33 G/DL (ref 31–37)
MCV RBC AUTO: 85.2 FL (ref 81–99)
MONOCYTES # BLD AUTO: 0.51 10*3/MM3 (ref 0–1)
MONOCYTES NFR BLD AUTO: 8.3 % (ref 3–8)
NEUTROPHILS # BLD AUTO: 3.57 10*3/MM3 (ref 1.5–8.3)
NEUTROPHILS NFR BLD AUTO: 57.8 % (ref 45–70)
NRBC BLD MANUAL-RTO: 0 /100 WBC (ref 0–0)
PLATELET # BLD AUTO: 244 10*3/MM3 (ref 140–500)
PMV BLD AUTO: 9.4 FL (ref 7.4–10.4)
POTASSIUM BLD-SCNC: 3.5 MMOL/L (ref 3.5–5.2)
RBC # BLD AUTO: 3.66 10*6/MM3 (ref 4.2–5.4)
SODIUM BLD-SCNC: 139 MMOL/L (ref 136–145)
WBC NRBC COR # BLD: 6.17 10*3/MM3 (ref 4.8–10.8)

## 2017-04-27 PROCEDURE — 99231 SBSQ HOSP IP/OBS SF/LOW 25: CPT | Performed by: HOSPITALIST

## 2017-04-27 PROCEDURE — 93010 ELECTROCARDIOGRAM REPORT: CPT | Performed by: INTERNAL MEDICINE

## 2017-04-27 PROCEDURE — 25010000002 ENOXAPARIN PER 10 MG: Performed by: SURGERY

## 2017-04-27 PROCEDURE — 99024 POSTOP FOLLOW-UP VISIT: CPT | Performed by: SURGERY

## 2017-04-27 PROCEDURE — 93005 ELECTROCARDIOGRAM TRACING: CPT | Performed by: HOSPITALIST

## 2017-04-27 PROCEDURE — 25010000002 HYDROMORPHONE PER 4 MG: Performed by: SURGERY

## 2017-04-27 PROCEDURE — 85025 COMPLETE CBC W/AUTO DIFF WBC: CPT | Performed by: SURGERY

## 2017-04-27 PROCEDURE — 80048 BASIC METABOLIC PNL TOTAL CA: CPT | Performed by: SURGERY

## 2017-04-27 PROCEDURE — 25010000002 LEVOFLOXACIN PER 250 MG: Performed by: SURGERY

## 2017-04-27 PROCEDURE — 25010000002 ONDANSETRON PER 1 MG: Performed by: SURGERY

## 2017-04-27 RX ADMIN — HYDROMORPHONE HYDROCHLORIDE 2 MG: 2 INJECTION, SOLUTION INTRAMUSCULAR; INTRAVENOUS; SUBCUTANEOUS at 05:07

## 2017-04-27 RX ADMIN — OXYCODONE HYDROCHLORIDE 15 MG: 5 TABLET ORAL at 13:20

## 2017-04-27 RX ADMIN — AMITRIPTYLINE HYDROCHLORIDE 100 MG: 25 TABLET, FILM COATED ORAL at 21:24

## 2017-04-27 RX ADMIN — ONDANSETRON 4 MG: 2 INJECTION, SOLUTION INTRAMUSCULAR; INTRAVENOUS at 06:08

## 2017-04-27 RX ADMIN — DOCUSATE SODIUM AND SENNOSIDES 2 TABLET: 8.6; 5 TABLET, FILM COATED ORAL at 17:31

## 2017-04-27 RX ADMIN — OXYCODONE HYDROCHLORIDE 15 MG: 5 TABLET ORAL at 06:08

## 2017-04-27 RX ADMIN — MAGNESIUM HYDROXIDE 30 ML: 400 SUSPENSION ORAL at 17:31

## 2017-04-27 RX ADMIN — ENOXAPARIN SODIUM 40 MG: 40 INJECTION SUBCUTANEOUS at 10:28

## 2017-04-27 RX ADMIN — PANTOPRAZOLE SODIUM 40 MG: 40 TABLET, DELAYED RELEASE ORAL at 05:09

## 2017-04-27 RX ADMIN — HYDROMORPHONE HYDROCHLORIDE 2 MG: 2 INJECTION, SOLUTION INTRAMUSCULAR; INTRAVENOUS; SUBCUTANEOUS at 10:38

## 2017-04-27 RX ADMIN — FLUCONAZOLE 200 MG: 100 TABLET ORAL at 17:31

## 2017-04-27 RX ADMIN — MICONAZOLE NITRATE: 20 CREAM TOPICAL at 10:29

## 2017-04-27 RX ADMIN — SERTRALINE HYDROCHLORIDE 100 MG: 50 TABLET ORAL at 10:30

## 2017-04-27 RX ADMIN — DOCUSATE SODIUM AND SENNOSIDES 2 TABLET: 8.6; 5 TABLET, FILM COATED ORAL at 10:28

## 2017-04-27 RX ADMIN — OXYCODONE HYDROCHLORIDE 15 MG: 5 TABLET ORAL at 21:23

## 2017-04-27 RX ADMIN — LEVOFLOXACIN 500 MG: 500 INJECTION, SOLUTION INTRAVENOUS at 10:28

## 2017-04-27 RX ADMIN — HYDROMORPHONE HYDROCHLORIDE 2 MG: 2 INJECTION, SOLUTION INTRAMUSCULAR; INTRAVENOUS; SUBCUTANEOUS at 15:19

## 2017-04-28 VITALS
BODY MASS INDEX: 48.29 KG/M2 | DIASTOLIC BLOOD PRESSURE: 75 MMHG | WEIGHT: 246 LBS | SYSTOLIC BLOOD PRESSURE: 137 MMHG | TEMPERATURE: 98.5 F | HEIGHT: 60 IN | RESPIRATION RATE: 18 BRPM | OXYGEN SATURATION: 98 % | HEART RATE: 106 BPM

## 2017-04-28 LAB
ANION GAP SERPL CALCULATED.3IONS-SCNC: 12.1 MMOL/L
BACTERIA SPEC AEROBE CULT: NO GROWTH
BASOPHILS # BLD AUTO: 0.02 10*3/MM3 (ref 0–0.2)
BASOPHILS NFR BLD AUTO: 0.3 % (ref 0–2)
BUN BLD-MCNC: 12 MG/DL (ref 6–20)
BUN/CREAT SERPL: 9.8 (ref 7–25)
CALCIUM SPEC-SCNC: 8.5 MG/DL (ref 8.6–10.5)
CHLORIDE SERPL-SCNC: 94 MMOL/L (ref 98–107)
CO2 SERPL-SCNC: 28.9 MMOL/L (ref 22–29)
CREAT BLD-MCNC: 1.22 MG/DL (ref 0.57–1)
DEPRECATED RDW RBC AUTO: 42.5 FL (ref 37–54)
EOSINOPHIL # BLD AUTO: 0.08 10*3/MM3 (ref 0.1–0.3)
EOSINOPHIL NFR BLD AUTO: 1.4 % (ref 0–4)
ERYTHROCYTE [DISTWIDTH] IN BLOOD BY AUTOMATED COUNT: 13.7 % (ref 11.5–14.5)
GFR SERPL CREATININE-BSD FRML MDRD: 57 ML/MIN/1.73
GLUCOSE BLD-MCNC: 139 MG/DL (ref 65–99)
HCT VFR BLD AUTO: 30.1 % (ref 37–47)
HGB BLD-MCNC: 10.2 G/DL (ref 12–16)
IMM GRANULOCYTES # BLD: 0.05 10*3/MM3 (ref 0–0.03)
IMM GRANULOCYTES NFR BLD: 0.9 % (ref 0–0.5)
LYMPHOCYTES # BLD AUTO: 1.56 10*3/MM3 (ref 0.6–4.8)
LYMPHOCYTES NFR BLD AUTO: 26.9 % (ref 20–45)
MCH RBC QN AUTO: 28.9 PG (ref 27–31)
MCHC RBC AUTO-ENTMCNC: 33.9 G/DL (ref 31–37)
MCV RBC AUTO: 85.3 FL (ref 81–99)
MONOCYTES # BLD AUTO: 0.53 10*3/MM3 (ref 0–1)
MONOCYTES NFR BLD AUTO: 9.1 % (ref 3–8)
NEUTROPHILS # BLD AUTO: 3.57 10*3/MM3 (ref 1.5–8.3)
NEUTROPHILS NFR BLD AUTO: 61.4 % (ref 45–70)
NRBC BLD MANUAL-RTO: 0 /100 WBC (ref 0–0)
PLATELET # BLD AUTO: 270 10*3/MM3 (ref 140–500)
PMV BLD AUTO: 9.3 FL (ref 7.4–10.4)
POTASSIUM BLD-SCNC: 3.3 MMOL/L (ref 3.5–5.2)
RBC # BLD AUTO: 3.53 10*6/MM3 (ref 4.2–5.4)
SODIUM BLD-SCNC: 135 MMOL/L (ref 136–145)
WBC NRBC COR # BLD: 5.81 10*3/MM3 (ref 4.8–10.8)

## 2017-04-28 PROCEDURE — 99024 POSTOP FOLLOW-UP VISIT: CPT | Performed by: SURGERY

## 2017-04-28 PROCEDURE — 80048 BASIC METABOLIC PNL TOTAL CA: CPT | Performed by: SURGERY

## 2017-04-28 PROCEDURE — 25010000002 ENOXAPARIN PER 10 MG: Performed by: SURGERY

## 2017-04-28 PROCEDURE — 25010000002 LEVOFLOXACIN PER 250 MG: Performed by: SURGERY

## 2017-04-28 PROCEDURE — 85025 COMPLETE CBC W/AUTO DIFF WBC: CPT | Performed by: SURGERY

## 2017-04-28 RX ORDER — SENNA AND DOCUSATE SODIUM 50; 8.6 MG/1; MG/1
1 TABLET, FILM COATED ORAL DAILY PRN
Qty: 30 TABLET | Refills: 0 | Status: SHIPPED | OUTPATIENT
Start: 2017-04-28 | End: 2017-05-11 | Stop reason: HOSPADM

## 2017-04-28 RX ORDER — ONDANSETRON 4 MG/1
4 TABLET, ORALLY DISINTEGRATING ORAL EVERY 8 HOURS PRN
Qty: 20 TABLET | Refills: 0 | Status: ON HOLD | OUTPATIENT
Start: 2017-04-28 | End: 2017-05-11

## 2017-04-28 RX ORDER — POTASSIUM CHLORIDE 20 MEQ/1
20 TABLET, EXTENDED RELEASE ORAL 2 TIMES DAILY WITH MEALS
Status: COMPLETED | OUTPATIENT
Start: 2017-04-28 | End: 2017-04-28

## 2017-04-28 RX ORDER — ESOMEPRAZOLE MAGNESIUM 20 MG/1
20 TABLET, DELAYED RELEASE ORAL DAILY
Qty: 30 TABLET | Refills: 0 | Status: SHIPPED | OUTPATIENT
Start: 2017-04-28

## 2017-04-28 RX ORDER — OXYCODONE AND ACETAMINOPHEN 10; 325 MG/1; MG/1
1 TABLET ORAL EVERY 6 HOURS PRN
Qty: 30 TABLET | Refills: 0 | Status: SHIPPED | OUTPATIENT
Start: 2017-04-28 | End: 2017-05-11 | Stop reason: HOSPADM

## 2017-04-28 RX ORDER — DOCUSATE SODIUM 100 MG/1
100 CAPSULE, LIQUID FILLED ORAL 2 TIMES DAILY PRN
Qty: 60 CAPSULE | Refills: 0 | Status: ON HOLD | OUTPATIENT
Start: 2017-04-28 | End: 2017-05-11

## 2017-04-28 RX ORDER — LEVOFLOXACIN 500 MG/1
500 TABLET, FILM COATED ORAL DAILY
Qty: 7 TABLET | Refills: 0 | Status: SHIPPED | OUTPATIENT
Start: 2017-04-28 | End: 2017-05-11 | Stop reason: HOSPADM

## 2017-04-28 RX ADMIN — LEVOFLOXACIN 500 MG: 500 INJECTION, SOLUTION INTRAVENOUS at 10:37

## 2017-04-28 RX ADMIN — BUTALBITAL, ACETAMINOPHEN, AND CAFFEINE 1 TABLET: 50; 325; 40 TABLET ORAL at 02:50

## 2017-04-28 RX ADMIN — POTASSIUM CHLORIDE 20 MEQ: 1500 TABLET, EXTENDED RELEASE ORAL at 16:27

## 2017-04-28 RX ADMIN — FLUCONAZOLE 200 MG: 100 TABLET ORAL at 16:27

## 2017-04-28 RX ADMIN — DOCUSATE SODIUM AND SENNOSIDES 2 TABLET: 8.6; 5 TABLET, FILM COATED ORAL at 09:43

## 2017-04-28 RX ADMIN — PANTOPRAZOLE SODIUM 40 MG: 40 TABLET, DELAYED RELEASE ORAL at 07:02

## 2017-04-28 RX ADMIN — ENOXAPARIN SODIUM 40 MG: 40 INJECTION SUBCUTANEOUS at 09:43

## 2017-04-28 RX ADMIN — SERTRALINE HYDROCHLORIDE 100 MG: 50 TABLET ORAL at 09:43

## 2017-04-28 RX ADMIN — OXYCODONE HYDROCHLORIDE 15 MG: 5 TABLET ORAL at 16:26

## 2017-04-28 RX ADMIN — OXYCODONE HYDROCHLORIDE 15 MG: 5 TABLET ORAL at 07:03

## 2017-04-28 RX ADMIN — ALPRAZOLAM 0.25 MG: 0.25 TABLET ORAL at 10:37

## 2017-04-28 RX ADMIN — POTASSIUM CHLORIDE 20 MEQ: 1500 TABLET, EXTENDED RELEASE ORAL at 09:43

## 2017-04-30 ENCOUNTER — HOSPITAL ENCOUNTER (INPATIENT)
Facility: HOSPITAL | Age: 47
LOS: 10 days | Discharge: HOME HEALTH | End: 2017-05-11
Attending: EMERGENCY MEDICINE | Admitting: SURGERY

## 2017-04-30 ENCOUNTER — APPOINTMENT (OUTPATIENT)
Dept: CT IMAGING | Facility: HOSPITAL | Age: 47
End: 2017-04-30

## 2017-04-30 DIAGNOSIS — K56.609 SMALL BOWEL OBSTRUCTION (HCC): Primary | ICD-10-CM

## 2017-04-30 DIAGNOSIS — R11.0 NAUSEA: ICD-10-CM

## 2017-04-30 LAB
ALBUMIN SERPL-MCNC: 3 G/DL (ref 3.5–5.2)
ALBUMIN/GLOB SERPL: 0.8 G/DL
ALP SERPL-CCNC: 72 U/L (ref 40–129)
ALT SERPL W P-5'-P-CCNC: 21 U/L (ref 5–33)
ANION GAP SERPL CALCULATED.3IONS-SCNC: 12.4 MMOL/L
AST SERPL-CCNC: 35 U/L (ref 5–32)
BASOPHILS # BLD AUTO: 0.02 10*3/MM3 (ref 0–0.2)
BASOPHILS NFR BLD AUTO: 0.2 % (ref 0–2)
BILIRUB SERPL-MCNC: 0.4 MG/DL (ref 0.2–1.2)
BUN BLD-MCNC: 6 MG/DL (ref 6–20)
BUN/CREAT SERPL: 6.8 (ref 7–25)
CALCIUM SPEC-SCNC: 8.7 MG/DL (ref 8.6–10.5)
CHLORIDE SERPL-SCNC: 101 MMOL/L (ref 98–107)
CO2 SERPL-SCNC: 24.6 MMOL/L (ref 22–29)
CREAT BLD-MCNC: 0.88 MG/DL (ref 0.57–1)
DEPRECATED RDW RBC AUTO: 45.1 FL (ref 37–54)
EOSINOPHIL # BLD AUTO: 0.04 10*3/MM3 (ref 0.1–0.3)
EOSINOPHIL NFR BLD AUTO: 0.4 % (ref 0–4)
ERYTHROCYTE [DISTWIDTH] IN BLOOD BY AUTOMATED COUNT: 14.4 % (ref 11.5–14.5)
GFR SERPL CREATININE-BSD FRML MDRD: 83 ML/MIN/1.73
GLOBULIN UR ELPH-MCNC: 3.7 GM/DL
GLUCOSE BLD-MCNC: 111 MG/DL (ref 65–99)
HCT VFR BLD AUTO: 35 % (ref 37–47)
HGB BLD-MCNC: 11.4 G/DL (ref 12–16)
IMM GRANULOCYTES # BLD: 0.07 10*3/MM3 (ref 0–0.03)
IMM GRANULOCYTES NFR BLD: 0.8 % (ref 0–0.5)
LYMPHOCYTES # BLD AUTO: 1.08 10*3/MM3 (ref 0.6–4.8)
LYMPHOCYTES NFR BLD AUTO: 11.9 % (ref 20–45)
MCH RBC QN AUTO: 28.5 PG (ref 27–31)
MCHC RBC AUTO-ENTMCNC: 32.6 G/DL (ref 31–37)
MCV RBC AUTO: 87.5 FL (ref 81–99)
MONOCYTES # BLD AUTO: 0.65 10*3/MM3 (ref 0–1)
MONOCYTES NFR BLD AUTO: 7.1 % (ref 3–8)
NEUTROPHILS # BLD AUTO: 7.25 10*3/MM3 (ref 1.5–8.3)
NEUTROPHILS NFR BLD AUTO: 79.6 % (ref 45–70)
NRBC BLD MANUAL-RTO: 0 /100 WBC (ref 0–0)
PLATELET # BLD AUTO: 337 10*3/MM3 (ref 140–500)
PMV BLD AUTO: 9.3 FL (ref 7.4–10.4)
POTASSIUM BLD-SCNC: 5.3 MMOL/L (ref 3.5–5.2)
PROT SERPL-MCNC: 6.7 G/DL (ref 6–8.5)
RBC # BLD AUTO: 4 10*6/MM3 (ref 4.2–5.4)
SODIUM BLD-SCNC: 138 MMOL/L (ref 136–145)
WBC NRBC COR # BLD: 9.11 10*3/MM3 (ref 4.8–10.8)

## 2017-04-30 PROCEDURE — 25010000002 HYDROMORPHONE PER 4 MG: Performed by: EMERGENCY MEDICINE

## 2017-04-30 PROCEDURE — 25010000002 PROCHLORPERAZINE EDISYLATE PER 10 MG: Performed by: EMERGENCY MEDICINE

## 2017-04-30 PROCEDURE — 85025 COMPLETE CBC W/AUTO DIFF WBC: CPT | Performed by: EMERGENCY MEDICINE

## 2017-04-30 PROCEDURE — 99285 EMERGENCY DEPT VISIT HI MDM: CPT | Performed by: EMERGENCY MEDICINE

## 2017-04-30 PROCEDURE — 74176 CT ABD & PELVIS W/O CONTRAST: CPT

## 2017-04-30 PROCEDURE — 80053 COMPREHEN METABOLIC PANEL: CPT | Performed by: EMERGENCY MEDICINE

## 2017-04-30 PROCEDURE — 99283 EMERGENCY DEPT VISIT LOW MDM: CPT

## 2017-04-30 RX ORDER — SODIUM CHLORIDE 0.9 % (FLUSH) 0.9 %
10 SYRINGE (ML) INJECTION AS NEEDED
Status: DISCONTINUED | OUTPATIENT
Start: 2017-04-30 | End: 2017-05-11 | Stop reason: HOSPADM

## 2017-04-30 RX ORDER — ONDANSETRON 2 MG/ML
4 INJECTION INTRAMUSCULAR; INTRAVENOUS EVERY 6 HOURS PRN
Status: DISCONTINUED | OUTPATIENT
Start: 2017-04-30 | End: 2017-05-01

## 2017-04-30 RX ORDER — PROMETHAZINE HYDROCHLORIDE 25 MG/ML
12.5 INJECTION, SOLUTION INTRAMUSCULAR; INTRAVENOUS ONCE
Status: DISCONTINUED | OUTPATIENT
Start: 2017-04-30 | End: 2017-04-30

## 2017-04-30 RX ORDER — SODIUM CHLORIDE 9 MG/ML
125 INJECTION, SOLUTION INTRAVENOUS CONTINUOUS
Status: DISCONTINUED | OUTPATIENT
Start: 2017-04-30 | End: 2017-05-01

## 2017-04-30 RX ADMIN — SODIUM CHLORIDE 500 ML: 900 INJECTION, SOLUTION INTRAVENOUS at 22:48

## 2017-04-30 RX ADMIN — HYDROMORPHONE HYDROCHLORIDE 1 MG: 1 INJECTION, SOLUTION INTRAMUSCULAR; INTRAVENOUS; SUBCUTANEOUS at 23:22

## 2017-04-30 RX ADMIN — PROCHLORPERAZINE EDISYLATE 10 MG: 5 INJECTION INTRAMUSCULAR; INTRAVENOUS at 22:47

## 2017-05-01 ENCOUNTER — APPOINTMENT (OUTPATIENT)
Dept: GENERAL RADIOLOGY | Facility: HOSPITAL | Age: 47
End: 2017-05-01

## 2017-05-01 LAB
BILIRUB UR QL STRIP: ABNORMAL
CLARITY UR: CLEAR
COLOR UR: YELLOW
GLUCOSE UR STRIP-MCNC: NEGATIVE MG/DL
HGB UR QL STRIP.AUTO: NEGATIVE
KETONES UR QL STRIP: ABNORMAL
LEUKOCYTE ESTERASE UR QL STRIP.AUTO: NEGATIVE
NITRITE UR QL STRIP: NEGATIVE
PH UR STRIP.AUTO: 7 [PH] (ref 4.5–8)
POTASSIUM BLD-SCNC: 3.6 MMOL/L (ref 3.5–5.2)
PROT UR QL STRIP: NEGATIVE
SP GR UR STRIP: 1.02 (ref 1–1.03)
UROBILINOGEN UR QL STRIP: ABNORMAL

## 2017-05-01 PROCEDURE — 25010000002 HYDROMORPHONE PER 4 MG: Performed by: EMERGENCY MEDICINE

## 2017-05-01 PROCEDURE — 25010000002 LORAZEPAM PER 2 MG: Performed by: HOSPITALIST

## 2017-05-01 PROCEDURE — 25010000002 ONDANSETRON PER 1 MG: Performed by: EMERGENCY MEDICINE

## 2017-05-01 PROCEDURE — 84132 ASSAY OF SERUM POTASSIUM: CPT | Performed by: HOSPITALIST

## 2017-05-01 PROCEDURE — 93010 ELECTROCARDIOGRAM REPORT: CPT | Performed by: INTERNAL MEDICINE

## 2017-05-01 PROCEDURE — 74022 RADEX COMPL AQT ABD SERIES: CPT

## 2017-05-01 PROCEDURE — 25010000002 LORAZEPAM PER 2 MG: Performed by: SURGERY

## 2017-05-01 PROCEDURE — 93005 ELECTROCARDIOGRAM TRACING: CPT | Performed by: SURGERY

## 2017-05-01 PROCEDURE — 25010000002 HYDROMORPHONE PER 4 MG: Performed by: SURGERY

## 2017-05-01 PROCEDURE — 99219 PR INITIAL OBSERVATION CARE/DAY 50 MINUTES: CPT | Performed by: SURGERY

## 2017-05-01 PROCEDURE — 81003 URINALYSIS AUTO W/O SCOPE: CPT | Performed by: SURGERY

## 2017-05-01 PROCEDURE — 25010000002 ENOXAPARIN PER 10 MG: Performed by: SURGERY

## 2017-05-01 PROCEDURE — 94799 UNLISTED PULMONARY SVC/PX: CPT

## 2017-05-01 PROCEDURE — G0378 HOSPITAL OBSERVATION PER HR: HCPCS

## 2017-05-01 PROCEDURE — 99242 OFF/OP CONSLTJ NEW/EST SF 20: CPT | Performed by: HOSPITALIST

## 2017-05-01 RX ORDER — PANTOPRAZOLE SODIUM 40 MG/10ML
40 INJECTION, POWDER, LYOPHILIZED, FOR SOLUTION INTRAVENOUS
Status: DISCONTINUED | OUTPATIENT
Start: 2017-05-01 | End: 2017-05-05

## 2017-05-01 RX ORDER — PROMETHAZINE HYDROCHLORIDE 25 MG/ML
12.5 INJECTION, SOLUTION INTRAMUSCULAR; INTRAVENOUS EVERY 6 HOURS PRN
Status: DISCONTINUED | OUTPATIENT
Start: 2017-05-01 | End: 2017-05-11 | Stop reason: HOSPADM

## 2017-05-01 RX ORDER — LORAZEPAM 2 MG/ML
1 INJECTION INTRAMUSCULAR ONCE
Status: COMPLETED | OUTPATIENT
Start: 2017-05-01 | End: 2017-05-01

## 2017-05-01 RX ORDER — LORAZEPAM 2 MG/ML
1 INJECTION INTRAMUSCULAR EVERY 4 HOURS PRN
Status: DISCONTINUED | OUTPATIENT
Start: 2017-05-01 | End: 2017-05-03

## 2017-05-01 RX ORDER — SODIUM CHLORIDE, SODIUM LACTATE, POTASSIUM CHLORIDE, CALCIUM CHLORIDE 600; 310; 30; 20 MG/100ML; MG/100ML; MG/100ML; MG/100ML
100 INJECTION, SOLUTION INTRAVENOUS CONTINUOUS
Status: DISCONTINUED | OUTPATIENT
Start: 2017-05-01 | End: 2017-05-01

## 2017-05-01 RX ORDER — ONDANSETRON 2 MG/ML
4 INJECTION INTRAMUSCULAR; INTRAVENOUS EVERY 4 HOURS PRN
Status: DISCONTINUED | OUTPATIENT
Start: 2017-05-01 | End: 2017-05-11 | Stop reason: HOSPADM

## 2017-05-01 RX ORDER — SODIUM CHLORIDE 9 MG/ML
125 INJECTION, SOLUTION INTRAVENOUS CONTINUOUS
Status: DISCONTINUED | OUTPATIENT
Start: 2017-05-01 | End: 2017-05-03

## 2017-05-01 RX ORDER — ONDANSETRON 2 MG/ML
4 INJECTION INTRAMUSCULAR; INTRAVENOUS EVERY 6 HOURS PRN
Status: DISCONTINUED | OUTPATIENT
Start: 2017-05-01 | End: 2017-05-01

## 2017-05-01 RX ADMIN — SODIUM CHLORIDE, POTASSIUM CHLORIDE, SODIUM LACTATE AND CALCIUM CHLORIDE 100 ML/HR: 600; 310; 30; 20 INJECTION, SOLUTION INTRAVENOUS at 03:07

## 2017-05-01 RX ADMIN — LORAZEPAM 1 MG: 2 INJECTION INTRAMUSCULAR; INTRAVENOUS at 20:27

## 2017-05-01 RX ADMIN — ONDANSETRON 4 MG: 2 INJECTION, SOLUTION INTRAMUSCULAR; INTRAVENOUS at 00:24

## 2017-05-01 RX ADMIN — HYDROMORPHONE HYDROCHLORIDE 0.5 MG: 1 INJECTION, SOLUTION INTRAMUSCULAR; INTRAVENOUS; SUBCUTANEOUS at 17:10

## 2017-05-01 RX ADMIN — SODIUM CHLORIDE 125 ML/HR: 9 INJECTION, SOLUTION INTRAVENOUS at 00:24

## 2017-05-01 RX ADMIN — LORAZEPAM 1 MG: 2 INJECTION INTRAMUSCULAR; INTRAVENOUS at 15:06

## 2017-05-01 RX ADMIN — ENOXAPARIN SODIUM 40 MG: 40 INJECTION SUBCUTANEOUS at 12:42

## 2017-05-01 RX ADMIN — HYDROMORPHONE HYDROCHLORIDE 1 MG: 1 INJECTION, SOLUTION INTRAMUSCULAR; INTRAVENOUS; SUBCUTANEOUS at 00:23

## 2017-05-01 RX ADMIN — HYDROMORPHONE HYDROCHLORIDE 1 MG: 1 INJECTION, SOLUTION INTRAMUSCULAR; INTRAVENOUS; SUBCUTANEOUS at 03:07

## 2017-05-01 RX ADMIN — LORAZEPAM 1 MG: 2 INJECTION INTRAMUSCULAR; INTRAVENOUS at 09:33

## 2017-05-01 RX ADMIN — HYDROMORPHONE HYDROCHLORIDE 0.5 MG: 1 INJECTION, SOLUTION INTRAMUSCULAR; INTRAVENOUS; SUBCUTANEOUS at 12:40

## 2017-05-01 RX ADMIN — PANTOPRAZOLE SODIUM 40 MG: 40 INJECTION, POWDER, FOR SOLUTION INTRAVENOUS at 12:42

## 2017-05-01 RX ADMIN — HYDROMORPHONE HYDROCHLORIDE 0.5 MG: 1 INJECTION, SOLUTION INTRAMUSCULAR; INTRAVENOUS; SUBCUTANEOUS at 20:46

## 2017-05-01 RX ADMIN — SODIUM CHLORIDE 125 ML/HR: 9 INJECTION, SOLUTION INTRAVENOUS at 09:32

## 2017-05-01 RX ADMIN — SODIUM CHLORIDE 125 ML/HR: 9 INJECTION, SOLUTION INTRAVENOUS at 19:01

## 2017-05-02 ENCOUNTER — APPOINTMENT (OUTPATIENT)
Dept: GENERAL RADIOLOGY | Facility: HOSPITAL | Age: 47
End: 2017-05-02

## 2017-05-02 LAB
ANION GAP SERPL CALCULATED.3IONS-SCNC: 12.4 MMOL/L
BASOPHILS # BLD AUTO: 0.01 10*3/MM3 (ref 0–0.2)
BASOPHILS NFR BLD AUTO: 0.1 % (ref 0–2)
BUN BLD-MCNC: 5 MG/DL (ref 6–20)
BUN/CREAT SERPL: 7.1 (ref 7–25)
CALCIUM SPEC-SCNC: 7.7 MG/DL (ref 8.6–10.5)
CHLORIDE SERPL-SCNC: 102 MMOL/L (ref 98–107)
CO2 SERPL-SCNC: 27.6 MMOL/L (ref 22–29)
CREAT BLD-MCNC: 0.7 MG/DL (ref 0.57–1)
DEPRECATED RDW RBC AUTO: 44.2 FL (ref 37–54)
EOSINOPHIL # BLD AUTO: 0.07 10*3/MM3 (ref 0.1–0.3)
EOSINOPHIL NFR BLD AUTO: 1 % (ref 0–4)
ERYTHROCYTE [DISTWIDTH] IN BLOOD BY AUTOMATED COUNT: 14 % (ref 11.5–14.5)
GFR SERPL CREATININE-BSD FRML MDRD: 109 ML/MIN/1.73
GLUCOSE BLD-MCNC: 101 MG/DL (ref 65–99)
HCT VFR BLD AUTO: 29.6 % (ref 37–47)
HGB BLD-MCNC: 9.7 G/DL (ref 12–16)
IMM GRANULOCYTES # BLD: 0.08 10*3/MM3 (ref 0–0.03)
IMM GRANULOCYTES NFR BLD: 1.2 % (ref 0–0.5)
LYMPHOCYTES # BLD AUTO: 1.18 10*3/MM3 (ref 0.6–4.8)
LYMPHOCYTES NFR BLD AUTO: 17 % (ref 20–45)
MCH RBC QN AUTO: 28.4 PG (ref 27–31)
MCHC RBC AUTO-ENTMCNC: 32.8 G/DL (ref 31–37)
MCV RBC AUTO: 86.5 FL (ref 81–99)
MONOCYTES # BLD AUTO: 0.53 10*3/MM3 (ref 0–1)
MONOCYTES NFR BLD AUTO: 7.6 % (ref 3–8)
NEUTROPHILS # BLD AUTO: 5.08 10*3/MM3 (ref 1.5–8.3)
NEUTROPHILS NFR BLD AUTO: 73.1 % (ref 45–70)
NRBC BLD MANUAL-RTO: 0.3 /100 WBC (ref 0–0)
PLATELET # BLD AUTO: 317 10*3/MM3 (ref 140–500)
PMV BLD AUTO: 8.8 FL (ref 7.4–10.4)
POTASSIUM BLD-SCNC: 3.5 MMOL/L (ref 3.5–5.2)
RBC # BLD AUTO: 3.42 10*6/MM3 (ref 4.2–5.4)
SODIUM BLD-SCNC: 142 MMOL/L (ref 136–145)
WBC NRBC COR # BLD: 6.95 10*3/MM3 (ref 4.8–10.8)

## 2017-05-02 PROCEDURE — 74022 RADEX COMPL AQT ABD SERIES: CPT

## 2017-05-02 PROCEDURE — 99225 PR SBSQ OBSERVATION CARE/DAY 25 MINUTES: CPT | Performed by: NURSE PRACTITIONER

## 2017-05-02 PROCEDURE — 99024 POSTOP FOLLOW-UP VISIT: CPT | Performed by: SURGERY

## 2017-05-02 PROCEDURE — 85025 COMPLETE CBC W/AUTO DIFF WBC: CPT | Performed by: SURGERY

## 2017-05-02 PROCEDURE — 25010000002 LORAZEPAM PER 2 MG: Performed by: HOSPITALIST

## 2017-05-02 PROCEDURE — 25010000002 ONDANSETRON PER 1 MG: Performed by: SURGERY

## 2017-05-02 PROCEDURE — 25010000002 ENOXAPARIN PER 10 MG: Performed by: SURGERY

## 2017-05-02 PROCEDURE — 25010000002 HYDROMORPHONE PER 4 MG: Performed by: SURGERY

## 2017-05-02 PROCEDURE — 80048 BASIC METABOLIC PNL TOTAL CA: CPT | Performed by: SURGERY

## 2017-05-02 PROCEDURE — G0378 HOSPITAL OBSERVATION PER HR: HCPCS

## 2017-05-02 RX ORDER — ENALAPRILAT 2.5 MG/2ML
1.25 INJECTION INTRAVENOUS EVERY 6 HOURS
Status: DISCONTINUED | OUTPATIENT
Start: 2017-05-02 | End: 2017-05-05

## 2017-05-02 RX ORDER — BISACODYL 10 MG
10 SUPPOSITORY, RECTAL RECTAL ONCE
Status: COMPLETED | OUTPATIENT
Start: 2017-05-02 | End: 2017-05-02

## 2017-05-02 RX ADMIN — ENALAPRILAT 1.25 MG: 2.5 INJECTION INTRAVENOUS at 22:17

## 2017-05-02 RX ADMIN — SODIUM CHLORIDE 125 ML/HR: 9 INJECTION, SOLUTION INTRAVENOUS at 22:18

## 2017-05-02 RX ADMIN — BENZOCAINE AND MENTHOL 1 LOZENGE: 15; 3.6 LOZENGE ORAL at 19:25

## 2017-05-02 RX ADMIN — ENOXAPARIN SODIUM 40 MG: 40 INJECTION SUBCUTANEOUS at 09:12

## 2017-05-02 RX ADMIN — BENZOCAINE AND MENTHOL 1 LOZENGE: 15; 3.6 LOZENGE ORAL at 17:20

## 2017-05-02 RX ADMIN — ENALAPRILAT 1.25 MG: 2.5 INJECTION INTRAVENOUS at 16:30

## 2017-05-02 RX ADMIN — PANTOPRAZOLE SODIUM 40 MG: 40 INJECTION, POWDER, FOR SOLUTION INTRAVENOUS at 05:12

## 2017-05-02 RX ADMIN — HYDROMORPHONE HYDROCHLORIDE 0.5 MG: 1 INJECTION, SOLUTION INTRAMUSCULAR; INTRAVENOUS; SUBCUTANEOUS at 20:53

## 2017-05-02 RX ADMIN — LORAZEPAM 1 MG: 2 INJECTION INTRAMUSCULAR; INTRAVENOUS at 05:12

## 2017-05-02 RX ADMIN — LORAZEPAM 1 MG: 2 INJECTION INTRAMUSCULAR; INTRAVENOUS at 19:57

## 2017-05-02 RX ADMIN — SODIUM CHLORIDE 125 ML/HR: 9 INJECTION, SOLUTION INTRAVENOUS at 12:51

## 2017-05-02 RX ADMIN — BISACODYL 10 MG: 10 SUPPOSITORY RECTAL at 16:30

## 2017-05-02 RX ADMIN — HYDROMORPHONE HYDROCHLORIDE 0.5 MG: 1 INJECTION, SOLUTION INTRAMUSCULAR; INTRAVENOUS; SUBCUTANEOUS at 03:26

## 2017-05-02 RX ADMIN — LORAZEPAM 1 MG: 2 INJECTION INTRAMUSCULAR; INTRAVENOUS at 11:16

## 2017-05-02 RX ADMIN — LORAZEPAM 1 MG: 2 INJECTION INTRAMUSCULAR; INTRAVENOUS at 00:29

## 2017-05-02 RX ADMIN — HYDROMORPHONE HYDROCHLORIDE 0.5 MG: 1 INJECTION, SOLUTION INTRAMUSCULAR; INTRAVENOUS; SUBCUTANEOUS at 16:45

## 2017-05-02 RX ADMIN — ENALAPRILAT 1.25 MG: 2.5 INJECTION INTRAVENOUS at 10:36

## 2017-05-02 RX ADMIN — SODIUM CHLORIDE 125 ML/HR: 9 INJECTION, SOLUTION INTRAVENOUS at 03:26

## 2017-05-02 RX ADMIN — ONDANSETRON 4 MG: 2 INJECTION, SOLUTION INTRAMUSCULAR; INTRAVENOUS at 21:11

## 2017-05-03 ENCOUNTER — APPOINTMENT (OUTPATIENT)
Dept: GENERAL RADIOLOGY | Facility: HOSPITAL | Age: 47
End: 2017-05-03

## 2017-05-03 LAB
ANION GAP SERPL CALCULATED.3IONS-SCNC: 11.4 MMOL/L
BUN BLD-MCNC: 5 MG/DL (ref 6–20)
BUN/CREAT SERPL: 7.8 (ref 7–25)
CALCIUM SPEC-SCNC: 7.7 MG/DL (ref 8.6–10.5)
CHLORIDE SERPL-SCNC: 104 MMOL/L (ref 98–107)
CO2 SERPL-SCNC: 27.6 MMOL/L (ref 22–29)
CREAT BLD-MCNC: 0.64 MG/DL (ref 0.57–1)
GFR SERPL CREATININE-BSD FRML MDRD: 121 ML/MIN/1.73
GLUCOSE BLD-MCNC: 94 MG/DL (ref 65–99)
HCT VFR BLD AUTO: 29.3 % (ref 37–47)
HGB BLD-MCNC: 9.7 G/DL (ref 12–16)
MAGNESIUM SERPL-MCNC: 1.9 MG/DL (ref 1.7–2.5)
POTASSIUM BLD-SCNC: 3.3 MMOL/L (ref 3.5–5.2)
SODIUM BLD-SCNC: 143 MMOL/L (ref 136–145)

## 2017-05-03 PROCEDURE — 80048 BASIC METABOLIC PNL TOTAL CA: CPT | Performed by: SURGERY

## 2017-05-03 PROCEDURE — 25010000002 LORAZEPAM PER 2 MG: Performed by: HOSPITALIST

## 2017-05-03 PROCEDURE — 83735 ASSAY OF MAGNESIUM: CPT | Performed by: NURSE PRACTITIONER

## 2017-05-03 PROCEDURE — 99225 PR SBSQ OBSERVATION CARE/DAY 25 MINUTES: CPT | Performed by: NURSE PRACTITIONER

## 2017-05-03 PROCEDURE — 25010000002 LORAZEPAM PER 2 MG: Performed by: SURGERY

## 2017-05-03 PROCEDURE — 85014 HEMATOCRIT: CPT | Performed by: SURGERY

## 2017-05-03 PROCEDURE — 74022 RADEX COMPL AQT ABD SERIES: CPT

## 2017-05-03 PROCEDURE — 25010000003 POTASSIUM CHLORIDE 10 MEQ/100ML SOLUTION: Performed by: SURGERY

## 2017-05-03 PROCEDURE — 99024 POSTOP FOLLOW-UP VISIT: CPT | Performed by: SURGERY

## 2017-05-03 PROCEDURE — 85018 HEMOGLOBIN: CPT | Performed by: SURGERY

## 2017-05-03 PROCEDURE — 25810000003 SODIUM CHLORIDE 0.9 % WITH KCL 20 MEQ 20-0.9 MEQ/L-% SOLUTION: Performed by: NURSE PRACTITIONER

## 2017-05-03 PROCEDURE — 25010000002 HYDROMORPHONE PER 4 MG: Performed by: SURGERY

## 2017-05-03 PROCEDURE — 25010000002 ENOXAPARIN PER 10 MG: Performed by: SURGERY

## 2017-05-03 RX ORDER — SODIUM CHLORIDE AND POTASSIUM CHLORIDE 150; 900 MG/100ML; MG/100ML
75 INJECTION, SOLUTION INTRAVENOUS CONTINUOUS
Status: DISCONTINUED | OUTPATIENT
Start: 2017-05-03 | End: 2017-05-05

## 2017-05-03 RX ORDER — ACETAMINOPHEN 650 MG/1
650 SUPPOSITORY RECTAL EVERY 4 HOURS PRN
Status: DISCONTINUED | OUTPATIENT
Start: 2017-05-03 | End: 2017-05-07

## 2017-05-03 RX ORDER — POTASSIUM CHLORIDE 7.45 MG/ML
10 INJECTION INTRAVENOUS ONCE
Status: COMPLETED | OUTPATIENT
Start: 2017-05-03 | End: 2017-05-03

## 2017-05-03 RX ORDER — LORAZEPAM 2 MG/ML
1 INJECTION INTRAMUSCULAR EVERY 8 HOURS PRN
Status: DISCONTINUED | OUTPATIENT
Start: 2017-05-03 | End: 2017-05-07

## 2017-05-03 RX ADMIN — SODIUM CHLORIDE 125 ML/HR: 9 INJECTION, SOLUTION INTRAVENOUS at 08:04

## 2017-05-03 RX ADMIN — HYDROMORPHONE HYDROCHLORIDE 0.5 MG: 1 INJECTION, SOLUTION INTRAMUSCULAR; INTRAVENOUS; SUBCUTANEOUS at 18:37

## 2017-05-03 RX ADMIN — ENOXAPARIN SODIUM 40 MG: 40 INJECTION SUBCUTANEOUS at 08:03

## 2017-05-03 RX ADMIN — LORAZEPAM 1 MG: 2 INJECTION INTRAMUSCULAR; INTRAVENOUS at 22:48

## 2017-05-03 RX ADMIN — LORAZEPAM 1 MG: 2 INJECTION INTRAMUSCULAR; INTRAVENOUS at 00:03

## 2017-05-03 RX ADMIN — POTASSIUM CHLORIDE 10 MEQ: 10 INJECTION, SOLUTION INTRAVENOUS at 09:58

## 2017-05-03 RX ADMIN — ENALAPRILAT 1.25 MG: 2.5 INJECTION INTRAVENOUS at 17:49

## 2017-05-03 RX ADMIN — ACETAMINOPHEN 650 MG: 650 SUPPOSITORY RECTAL at 18:19

## 2017-05-03 RX ADMIN — POTASSIUM CHLORIDE AND SODIUM CHLORIDE 125 ML/HR: 900; 150 INJECTION, SOLUTION INTRAVENOUS at 10:01

## 2017-05-03 RX ADMIN — HYDROMORPHONE HYDROCHLORIDE 0.5 MG: 1 INJECTION, SOLUTION INTRAMUSCULAR; INTRAVENOUS; SUBCUTANEOUS at 04:06

## 2017-05-03 RX ADMIN — ENALAPRILAT 1.25 MG: 2.5 INJECTION INTRAVENOUS at 10:15

## 2017-05-03 RX ADMIN — ENALAPRILAT 1.25 MG: 2.5 INJECTION INTRAVENOUS at 22:48

## 2017-05-03 RX ADMIN — HYDROMORPHONE HYDROCHLORIDE 0.5 MG: 1 INJECTION, SOLUTION INTRAMUSCULAR; INTRAVENOUS; SUBCUTANEOUS at 10:15

## 2017-05-03 RX ADMIN — PANTOPRAZOLE SODIUM 40 MG: 40 INJECTION, POWDER, FOR SOLUTION INTRAVENOUS at 06:35

## 2017-05-03 RX ADMIN — ENOXAPARIN SODIUM 40 MG: 40 INJECTION SUBCUTANEOUS at 20:07

## 2017-05-03 RX ADMIN — LORAZEPAM 1 MG: 2 INJECTION INTRAMUSCULAR; INTRAVENOUS at 10:15

## 2017-05-03 RX ADMIN — ENALAPRILAT 1.25 MG: 2.5 INJECTION INTRAVENOUS at 04:06

## 2017-05-04 ENCOUNTER — APPOINTMENT (OUTPATIENT)
Dept: GENERAL RADIOLOGY | Facility: HOSPITAL | Age: 47
End: 2017-05-04

## 2017-05-04 PROCEDURE — 71010 HC CHEST PA OR AP: CPT

## 2017-05-04 PROCEDURE — 25010000002 HYDROMORPHONE PER 4 MG: Performed by: SURGERY

## 2017-05-04 PROCEDURE — 74250 X-RAY XM SM INT 1CNTRST STD: CPT

## 2017-05-04 PROCEDURE — 25810000003 SODIUM CHLORIDE 0.9 % WITH KCL 20 MEQ 20-0.9 MEQ/L-% SOLUTION: Performed by: NURSE PRACTITIONER

## 2017-05-04 PROCEDURE — 25010000002 LORAZEPAM PER 2 MG: Performed by: SURGERY

## 2017-05-04 PROCEDURE — 99024 POSTOP FOLLOW-UP VISIT: CPT | Performed by: SURGERY

## 2017-05-04 PROCEDURE — 25010000002 ONDANSETRON PER 1 MG: Performed by: SURGERY

## 2017-05-04 PROCEDURE — 74020 HC XR ABDOMEN FLAT & UPRIGHT: CPT

## 2017-05-04 PROCEDURE — 25010000002 ENOXAPARIN PER 10 MG: Performed by: SURGERY

## 2017-05-04 PROCEDURE — 99231 SBSQ HOSP IP/OBS SF/LOW 25: CPT | Performed by: NURSE PRACTITIONER

## 2017-05-04 PROCEDURE — 25010000002 METOCLOPRAMIDE PER 10 MG: Performed by: SURGERY

## 2017-05-04 PROCEDURE — C1894 INTRO/SHEATH, NON-LASER: HCPCS

## 2017-05-04 PROCEDURE — 02HV33Z INSERTION OF INFUSION DEVICE INTO SUPERIOR VENA CAVA, PERCUTANEOUS APPROACH: ICD-10-PCS | Performed by: SURGERY

## 2017-05-04 RX ORDER — METOCLOPRAMIDE HYDROCHLORIDE 5 MG/ML
10 INJECTION INTRAMUSCULAR; INTRAVENOUS EVERY 6 HOURS
Status: DISCONTINUED | OUTPATIENT
Start: 2017-05-04 | End: 2017-05-11 | Stop reason: HOSPADM

## 2017-05-04 RX ADMIN — HYDROMORPHONE HYDROCHLORIDE 0.5 MG: 1 INJECTION, SOLUTION INTRAMUSCULAR; INTRAVENOUS; SUBCUTANEOUS at 13:42

## 2017-05-04 RX ADMIN — HYDROMORPHONE HYDROCHLORIDE 0.5 MG: 1 INJECTION, SOLUTION INTRAMUSCULAR; INTRAVENOUS; SUBCUTANEOUS at 21:08

## 2017-05-04 RX ADMIN — LORAZEPAM 1 MG: 2 INJECTION INTRAMUSCULAR; INTRAVENOUS at 13:11

## 2017-05-04 RX ADMIN — ENALAPRILAT 1.25 MG: 2.5 INJECTION INTRAVENOUS at 11:10

## 2017-05-04 RX ADMIN — ENOXAPARIN SODIUM 40 MG: 40 INJECTION SUBCUTANEOUS at 08:38

## 2017-05-04 RX ADMIN — HYDROMORPHONE HYDROCHLORIDE 0.5 MG: 1 INJECTION, SOLUTION INTRAMUSCULAR; INTRAVENOUS; SUBCUTANEOUS at 01:26

## 2017-05-04 RX ADMIN — HYDROMORPHONE HYDROCHLORIDE 0.5 MG: 1 INJECTION, SOLUTION INTRAMUSCULAR; INTRAVENOUS; SUBCUTANEOUS at 09:21

## 2017-05-04 RX ADMIN — ONDANSETRON 4 MG: 2 INJECTION, SOLUTION INTRAMUSCULAR; INTRAVENOUS at 11:09

## 2017-05-04 RX ADMIN — METOCLOPRAMIDE 10 MG: 5 INJECTION, SOLUTION INTRAMUSCULAR; INTRAVENOUS at 21:08

## 2017-05-04 RX ADMIN — POTASSIUM CHLORIDE AND SODIUM CHLORIDE 125 ML/HR: 900; 150 INJECTION, SOLUTION INTRAVENOUS at 15:47

## 2017-05-04 RX ADMIN — ENALAPRILAT 1.25 MG: 2.5 INJECTION INTRAVENOUS at 15:55

## 2017-05-04 RX ADMIN — ENALAPRILAT 1.25 MG: 2.5 INJECTION INTRAVENOUS at 21:09

## 2017-05-04 RX ADMIN — ENOXAPARIN SODIUM 40 MG: 40 INJECTION SUBCUTANEOUS at 21:08

## 2017-05-04 RX ADMIN — PANTOPRAZOLE SODIUM 40 MG: 40 INJECTION, POWDER, FOR SOLUTION INTRAVENOUS at 08:38

## 2017-05-04 RX ADMIN — HYDROMORPHONE HYDROCHLORIDE 0.5 MG: 1 INJECTION, SOLUTION INTRAMUSCULAR; INTRAVENOUS; SUBCUTANEOUS at 00:05

## 2017-05-05 LAB
ANION GAP SERPL CALCULATED.3IONS-SCNC: 12.4 MMOL/L
BASOPHILS # BLD AUTO: 0.01 10*3/MM3 (ref 0–0.2)
BASOPHILS NFR BLD AUTO: 0.2 % (ref 0–2)
BUN BLD-MCNC: 3 MG/DL (ref 6–20)
BUN/CREAT SERPL: 5.1 (ref 7–25)
CALCIUM SPEC-SCNC: 7.6 MG/DL (ref 8.6–10.5)
CHLORIDE SERPL-SCNC: 99 MMOL/L (ref 98–107)
CO2 SERPL-SCNC: 23.6 MMOL/L (ref 22–29)
CREAT BLD-MCNC: 0.59 MG/DL (ref 0.57–1)
DEPRECATED RDW RBC AUTO: 40.8 FL (ref 37–54)
EOSINOPHIL # BLD AUTO: 0.1 10*3/MM3 (ref 0.1–0.3)
EOSINOPHIL NFR BLD AUTO: 1.7 % (ref 0–4)
ERYTHROCYTE [DISTWIDTH] IN BLOOD BY AUTOMATED COUNT: 13.5 % (ref 11.5–14.5)
GFR SERPL CREATININE-BSD FRML MDRD: 132 ML/MIN/1.73
GLUCOSE BLD-MCNC: 112 MG/DL (ref 65–99)
HCT VFR BLD AUTO: 28 % (ref 37–47)
HGB BLD-MCNC: 9.5 G/DL (ref 12–16)
IMM GRANULOCYTES # BLD: 0.03 10*3/MM3 (ref 0–0.03)
IMM GRANULOCYTES NFR BLD: 0.5 % (ref 0–0.5)
LYMPHOCYTES # BLD AUTO: 0.95 10*3/MM3 (ref 0.6–4.8)
LYMPHOCYTES NFR BLD AUTO: 15.7 % (ref 20–45)
MCH RBC QN AUTO: 28.2 PG (ref 27–31)
MCHC RBC AUTO-ENTMCNC: 33.9 G/DL (ref 31–37)
MCV RBC AUTO: 83.1 FL (ref 81–99)
MONOCYTES # BLD AUTO: 0.55 10*3/MM3 (ref 0–1)
MONOCYTES NFR BLD AUTO: 9.1 % (ref 3–8)
NEUTROPHILS # BLD AUTO: 4.42 10*3/MM3 (ref 1.5–8.3)
NEUTROPHILS NFR BLD AUTO: 72.8 % (ref 45–70)
NRBC BLD MANUAL-RTO: 0 /100 WBC (ref 0–0)
PLATELET # BLD AUTO: 300 10*3/MM3 (ref 140–500)
PMV BLD AUTO: 8.6 FL (ref 7.4–10.4)
POTASSIUM BLD-SCNC: 3 MMOL/L (ref 3.5–5.2)
RBC # BLD AUTO: 3.37 10*6/MM3 (ref 4.2–5.4)
SODIUM BLD-SCNC: 135 MMOL/L (ref 136–145)
WBC NRBC COR # BLD: 6.06 10*3/MM3 (ref 4.8–10.8)

## 2017-05-05 PROCEDURE — 25010000002 ENOXAPARIN PER 10 MG: Performed by: SURGERY

## 2017-05-05 PROCEDURE — 25010000002 HYDROMORPHONE PER 4 MG: Performed by: SURGERY

## 2017-05-05 PROCEDURE — 25810000003 SODIUM CHLORIDE 0.9 % WITH KCL 40 MEQ/L 40-0.9 MEQ/L-% SOLUTION: Performed by: NURSE PRACTITIONER

## 2017-05-05 PROCEDURE — 99232 SBSQ HOSP IP/OBS MODERATE 35: CPT | Performed by: NURSE PRACTITIONER

## 2017-05-05 PROCEDURE — 25010000003 POTASSIUM CHLORIDE 10 MEQ/100ML SOLUTION: Performed by: SURGERY

## 2017-05-05 PROCEDURE — 25010000002 LORAZEPAM PER 2 MG: Performed by: SURGERY

## 2017-05-05 PROCEDURE — 99231 SBSQ HOSP IP/OBS SF/LOW 25: CPT | Performed by: INTERNAL MEDICINE

## 2017-05-05 PROCEDURE — 99024 POSTOP FOLLOW-UP VISIT: CPT | Performed by: SURGERY

## 2017-05-05 PROCEDURE — 85025 COMPLETE CBC W/AUTO DIFF WBC: CPT | Performed by: SURGERY

## 2017-05-05 PROCEDURE — 25010000002 METOCLOPRAMIDE PER 10 MG: Performed by: SURGERY

## 2017-05-05 PROCEDURE — 80048 BASIC METABOLIC PNL TOTAL CA: CPT | Performed by: SURGERY

## 2017-05-05 RX ORDER — POTASSIUM CHLORIDE 7.45 MG/ML
10 INJECTION INTRAVENOUS
Status: COMPLETED | OUTPATIENT
Start: 2017-05-05 | End: 2017-05-05

## 2017-05-05 RX ORDER — LISINOPRIL 10 MG/1
10 TABLET ORAL
Status: DISCONTINUED | OUTPATIENT
Start: 2017-05-05 | End: 2017-05-11 | Stop reason: HOSPADM

## 2017-05-05 RX ORDER — POTASSIUM CHLORIDE 20 MEQ/1
40 TABLET, EXTENDED RELEASE ORAL ONCE
Status: COMPLETED | OUTPATIENT
Start: 2017-05-05 | End: 2017-05-05

## 2017-05-05 RX ORDER — PANTOPRAZOLE SODIUM 40 MG/1
40 TABLET, DELAYED RELEASE ORAL
Status: DISCONTINUED | OUTPATIENT
Start: 2017-05-05 | End: 2017-05-11 | Stop reason: HOSPADM

## 2017-05-05 RX ORDER — OXYCODONE AND ACETAMINOPHEN 7.5; 325 MG/1; MG/1
1 TABLET ORAL EVERY 6 HOURS PRN
Status: DISCONTINUED | OUTPATIENT
Start: 2017-05-05 | End: 2017-05-07

## 2017-05-05 RX ORDER — SODIUM CHLORIDE AND POTASSIUM CHLORIDE 300; 900 MG/100ML; MG/100ML
75 INJECTION, SOLUTION INTRAVENOUS CONTINUOUS
Status: DISCONTINUED | OUTPATIENT
Start: 2017-05-05 | End: 2017-05-07

## 2017-05-05 RX ADMIN — OXYCODONE HYDROCHLORIDE AND ACETAMINOPHEN 1 TABLET: 7.5; 325 TABLET ORAL at 18:19

## 2017-05-05 RX ADMIN — LORAZEPAM 1 MG: 2 INJECTION INTRAMUSCULAR; INTRAVENOUS at 00:18

## 2017-05-05 RX ADMIN — PANTOPRAZOLE SODIUM 40 MG: 40 INJECTION, POWDER, FOR SOLUTION INTRAVENOUS at 07:03

## 2017-05-05 RX ADMIN — POTASSIUM CHLORIDE AND SODIUM CHLORIDE 75 ML/HR: 900; 300 INJECTION, SOLUTION INTRAVENOUS at 23:58

## 2017-05-05 RX ADMIN — POTASSIUM CHLORIDE 10 MEQ: 7.46 INJECTION, SOLUTION INTRAVENOUS at 10:16

## 2017-05-05 RX ADMIN — METOCLOPRAMIDE 10 MG: 5 INJECTION, SOLUTION INTRAMUSCULAR; INTRAVENOUS at 00:18

## 2017-05-05 RX ADMIN — LISINOPRIL 10 MG: 10 TABLET ORAL at 10:22

## 2017-05-05 RX ADMIN — POTASSIUM CHLORIDE 40 MEQ: 1500 TABLET, EXTENDED RELEASE ORAL at 09:07

## 2017-05-05 RX ADMIN — POTASSIUM CHLORIDE 10 MEQ: 7.46 INJECTION, SOLUTION INTRAVENOUS at 11:34

## 2017-05-05 RX ADMIN — ENOXAPARIN SODIUM 40 MG: 40 INJECTION SUBCUTANEOUS at 21:25

## 2017-05-05 RX ADMIN — METOCLOPRAMIDE 10 MG: 5 INJECTION, SOLUTION INTRAMUSCULAR; INTRAVENOUS at 18:19

## 2017-05-05 RX ADMIN — METOCLOPRAMIDE 10 MG: 5 INJECTION, SOLUTION INTRAMUSCULAR; INTRAVENOUS at 05:55

## 2017-05-05 RX ADMIN — LORAZEPAM 1 MG: 2 INJECTION INTRAMUSCULAR; INTRAVENOUS at 10:17

## 2017-05-05 RX ADMIN — ENALAPRILAT 1.25 MG: 2.5 INJECTION INTRAVENOUS at 05:54

## 2017-05-05 RX ADMIN — LORAZEPAM 1 MG: 2 INJECTION INTRAMUSCULAR; INTRAVENOUS at 21:30

## 2017-05-05 RX ADMIN — POTASSIUM CHLORIDE 10 MEQ: 7.46 INJECTION, SOLUTION INTRAVENOUS at 14:07

## 2017-05-05 RX ADMIN — HYDROMORPHONE HYDROCHLORIDE 0.5 MG: 1 INJECTION, SOLUTION INTRAMUSCULAR; INTRAVENOUS; SUBCUTANEOUS at 05:55

## 2017-05-05 RX ADMIN — OXYCODONE HYDROCHLORIDE AND ACETAMINOPHEN 1 TABLET: 7.5; 325 TABLET ORAL at 11:40

## 2017-05-05 RX ADMIN — METOCLOPRAMIDE 10 MG: 5 INJECTION, SOLUTION INTRAMUSCULAR; INTRAVENOUS at 14:07

## 2017-05-05 RX ADMIN — ENOXAPARIN SODIUM 40 MG: 40 INJECTION SUBCUTANEOUS at 09:06

## 2017-05-06 LAB
ANION GAP SERPL CALCULATED.3IONS-SCNC: 10.3 MMOL/L
BUN BLD-MCNC: 3 MG/DL (ref 6–20)
BUN/CREAT SERPL: 5 (ref 7–25)
CALCIUM SPEC-SCNC: 7.6 MG/DL (ref 8.6–10.5)
CHLORIDE SERPL-SCNC: 106 MMOL/L (ref 98–107)
CO2 SERPL-SCNC: 24.7 MMOL/L (ref 22–29)
CREAT BLD-MCNC: 0.6 MG/DL (ref 0.57–1)
GFR SERPL CREATININE-BSD FRML MDRD: 130 ML/MIN/1.73
GLUCOSE BLD-MCNC: 108 MG/DL (ref 65–99)
MAGNESIUM SERPL-MCNC: 1.6 MG/DL (ref 1.7–2.5)
PHOSPHATE SERPL-MCNC: 2.8 MG/DL (ref 2.7–4.5)
POTASSIUM BLD-SCNC: 3.3 MMOL/L (ref 3.5–5.2)
SODIUM BLD-SCNC: 141 MMOL/L (ref 136–145)

## 2017-05-06 PROCEDURE — 99232 SBSQ HOSP IP/OBS MODERATE 35: CPT | Performed by: INTERNAL MEDICINE

## 2017-05-06 PROCEDURE — 25010000002 ENOXAPARIN PER 10 MG: Performed by: SURGERY

## 2017-05-06 PROCEDURE — 99231 SBSQ HOSP IP/OBS SF/LOW 25: CPT | Performed by: INTERNAL MEDICINE

## 2017-05-06 PROCEDURE — 25010000002 LORAZEPAM PER 2 MG: Performed by: SURGERY

## 2017-05-06 PROCEDURE — 84100 ASSAY OF PHOSPHORUS: CPT | Performed by: SURGERY

## 2017-05-06 PROCEDURE — 80048 BASIC METABOLIC PNL TOTAL CA: CPT | Performed by: SURGERY

## 2017-05-06 PROCEDURE — 83735 ASSAY OF MAGNESIUM: CPT | Performed by: SURGERY

## 2017-05-06 PROCEDURE — 25810000003 SODIUM CHLORIDE 0.9 % WITH KCL 40 MEQ/L 40-0.9 MEQ/L-% SOLUTION: Performed by: NURSE PRACTITIONER

## 2017-05-06 PROCEDURE — 99024 POSTOP FOLLOW-UP VISIT: CPT | Performed by: SURGERY

## 2017-05-06 PROCEDURE — 25010000002 METOCLOPRAMIDE PER 10 MG: Performed by: SURGERY

## 2017-05-06 RX ORDER — POTASSIUM CHLORIDE 20 MEQ/1
40 TABLET, EXTENDED RELEASE ORAL ONCE
Status: COMPLETED | OUTPATIENT
Start: 2017-05-06 | End: 2017-05-06

## 2017-05-06 RX ADMIN — ENOXAPARIN SODIUM 40 MG: 40 INJECTION SUBCUTANEOUS at 08:26

## 2017-05-06 RX ADMIN — POTASSIUM CHLORIDE AND SODIUM CHLORIDE 75 ML/HR: 900; 300 INJECTION, SOLUTION INTRAVENOUS at 15:50

## 2017-05-06 RX ADMIN — LISINOPRIL 10 MG: 10 TABLET ORAL at 08:26

## 2017-05-06 RX ADMIN — PANTOPRAZOLE SODIUM 40 MG: 40 TABLET, DELAYED RELEASE ORAL at 06:59

## 2017-05-06 RX ADMIN — OXYCODONE HYDROCHLORIDE AND ACETAMINOPHEN 1 TABLET: 7.5; 325 TABLET ORAL at 15:38

## 2017-05-06 RX ADMIN — LORAZEPAM 1 MG: 2 INJECTION INTRAMUSCULAR; INTRAVENOUS at 23:35

## 2017-05-06 RX ADMIN — ENOXAPARIN SODIUM 40 MG: 40 INJECTION SUBCUTANEOUS at 20:07

## 2017-05-06 RX ADMIN — METOCLOPRAMIDE 10 MG: 5 INJECTION, SOLUTION INTRAMUSCULAR; INTRAVENOUS at 18:08

## 2017-05-06 RX ADMIN — OXYCODONE HYDROCHLORIDE AND ACETAMINOPHEN 1 TABLET: 7.5; 325 TABLET ORAL at 22:17

## 2017-05-06 RX ADMIN — METOCLOPRAMIDE 10 MG: 5 INJECTION, SOLUTION INTRAMUSCULAR; INTRAVENOUS at 12:08

## 2017-05-06 RX ADMIN — METOCLOPRAMIDE 10 MG: 5 INJECTION, SOLUTION INTRAMUSCULAR; INTRAVENOUS at 02:00

## 2017-05-06 RX ADMIN — MAGNESIUM OXIDE TAB 400 MG (241.3 MG ELEMENTAL MG) 400 MG: 400 (241.3 MG) TAB at 10:50

## 2017-05-06 RX ADMIN — OXYCODONE HYDROCHLORIDE AND ACETAMINOPHEN 1 TABLET: 7.5; 325 TABLET ORAL at 08:26

## 2017-05-06 RX ADMIN — LORAZEPAM 1 MG: 2 INJECTION INTRAMUSCULAR; INTRAVENOUS at 15:29

## 2017-05-06 RX ADMIN — METOCLOPRAMIDE 10 MG: 5 INJECTION, SOLUTION INTRAMUSCULAR; INTRAVENOUS at 08:26

## 2017-05-06 RX ADMIN — OXYCODONE HYDROCHLORIDE AND ACETAMINOPHEN 1 TABLET: 7.5; 325 TABLET ORAL at 02:08

## 2017-05-06 RX ADMIN — POTASSIUM CHLORIDE 40 MEQ: 20 TABLET, EXTENDED RELEASE ORAL at 10:50

## 2017-05-07 ENCOUNTER — APPOINTMENT (OUTPATIENT)
Dept: GENERAL RADIOLOGY | Facility: HOSPITAL | Age: 47
End: 2017-05-07

## 2017-05-07 LAB
ANION GAP SERPL CALCULATED.3IONS-SCNC: 11.9 MMOL/L
BILIRUB UR QL STRIP: NEGATIVE
BUN BLD-MCNC: 3 MG/DL (ref 6–20)
BUN/CREAT SERPL: 5 (ref 7–25)
CALCIUM SPEC-SCNC: 7.8 MG/DL (ref 8.6–10.5)
CHLORIDE SERPL-SCNC: 103 MMOL/L (ref 98–107)
CLARITY UR: CLEAR
CO2 SERPL-SCNC: 24.1 MMOL/L (ref 22–29)
COLOR UR: YELLOW
CREAT BLD-MCNC: 0.6 MG/DL (ref 0.57–1)
GFR SERPL CREATININE-BSD FRML MDRD: 130 ML/MIN/1.73
GLUCOSE BLD-MCNC: 108 MG/DL (ref 65–99)
GLUCOSE UR STRIP-MCNC: NEGATIVE MG/DL
HGB UR QL STRIP.AUTO: NEGATIVE
KETONES UR QL STRIP: NEGATIVE
LEUKOCYTE ESTERASE UR QL STRIP.AUTO: NEGATIVE
MAGNESIUM SERPL-MCNC: 1.7 MG/DL (ref 1.7–2.5)
NITRITE UR QL STRIP: NEGATIVE
PH UR STRIP.AUTO: 6.5 [PH] (ref 4.5–8)
POTASSIUM BLD-SCNC: 3.9 MMOL/L (ref 3.5–5.2)
PROT UR QL STRIP: NEGATIVE
SODIUM BLD-SCNC: 139 MMOL/L (ref 136–145)
SP GR UR STRIP: 1.02 (ref 1–1.03)
UROBILINOGEN UR QL STRIP: NORMAL

## 2017-05-07 PROCEDURE — 80048 BASIC METABOLIC PNL TOTAL CA: CPT | Performed by: SURGERY

## 2017-05-07 PROCEDURE — 87070 CULTURE OTHR SPECIMN AEROBIC: CPT | Performed by: SURGERY

## 2017-05-07 PROCEDURE — 83735 ASSAY OF MAGNESIUM: CPT | Performed by: HOSPITALIST

## 2017-05-07 PROCEDURE — 99231 SBSQ HOSP IP/OBS SF/LOW 25: CPT | Performed by: INTERNAL MEDICINE

## 2017-05-07 PROCEDURE — 81003 URINALYSIS AUTO W/O SCOPE: CPT | Performed by: SURGERY

## 2017-05-07 PROCEDURE — 99024 POSTOP FOLLOW-UP VISIT: CPT | Performed by: SURGERY

## 2017-05-07 PROCEDURE — 87040 BLOOD CULTURE FOR BACTERIA: CPT | Performed by: SURGERY

## 2017-05-07 PROCEDURE — 25810000003 SODIUM CHLORIDE 0.9 % WITH KCL 40 MEQ/L 40-0.9 MEQ/L-% SOLUTION: Performed by: NURSE PRACTITIONER

## 2017-05-07 PROCEDURE — 25010000002 METOCLOPRAMIDE PER 10 MG: Performed by: SURGERY

## 2017-05-07 PROCEDURE — 25010000002 ENOXAPARIN PER 10 MG: Performed by: SURGERY

## 2017-05-07 PROCEDURE — 87205 SMEAR GRAM STAIN: CPT | Performed by: SURGERY

## 2017-05-07 PROCEDURE — 99232 SBSQ HOSP IP/OBS MODERATE 35: CPT | Performed by: HOSPITALIST

## 2017-05-07 PROCEDURE — 25010000002 LEVOFLOXACIN PER 250 MG

## 2017-05-07 PROCEDURE — 71010 HC CHEST PA OR AP: CPT

## 2017-05-07 RX ORDER — LEVOFLOXACIN 5 MG/ML
INJECTION, SOLUTION INTRAVENOUS
Status: COMPLETED
Start: 2017-05-07 | End: 2017-05-08

## 2017-05-07 RX ORDER — OXYCODONE AND ACETAMINOPHEN 7.5; 325 MG/1; MG/1
1 TABLET ORAL EVERY 4 HOURS PRN
Status: DISCONTINUED | OUTPATIENT
Start: 2017-05-07 | End: 2017-05-11 | Stop reason: HOSPADM

## 2017-05-07 RX ORDER — ALPRAZOLAM 0.25 MG/1
0.25 TABLET ORAL 3 TIMES DAILY PRN
Status: DISCONTINUED | OUTPATIENT
Start: 2017-05-07 | End: 2017-05-11 | Stop reason: HOSPADM

## 2017-05-07 RX ORDER — ACETAMINOPHEN 325 MG/1
650 TABLET ORAL EVERY 6 HOURS PRN
Status: DISCONTINUED | OUTPATIENT
Start: 2017-05-07 | End: 2017-05-11 | Stop reason: HOSPADM

## 2017-05-07 RX ORDER — AMITRIPTYLINE HYDROCHLORIDE 25 MG/1
100 TABLET, FILM COATED ORAL NIGHTLY
Status: DISCONTINUED | OUTPATIENT
Start: 2017-05-07 | End: 2017-05-11 | Stop reason: HOSPADM

## 2017-05-07 RX ORDER — LEVOFLOXACIN 5 MG/ML
500 INJECTION, SOLUTION INTRAVENOUS EVERY 24 HOURS
Status: DISCONTINUED | OUTPATIENT
Start: 2017-05-07 | End: 2017-05-08

## 2017-05-07 RX ADMIN — AMITRIPTYLINE HYDROCHLORIDE 100 MG: 25 TABLET, FILM COATED ORAL at 23:13

## 2017-05-07 RX ADMIN — MAGNESIUM OXIDE TAB 400 MG (241.3 MG ELEMENTAL MG) 400 MG: 400 (241.3 MG) TAB at 08:37

## 2017-05-07 RX ADMIN — METOCLOPRAMIDE 10 MG: 5 INJECTION, SOLUTION INTRAMUSCULAR; INTRAVENOUS at 00:59

## 2017-05-07 RX ADMIN — LISINOPRIL 10 MG: 10 TABLET ORAL at 08:11

## 2017-05-07 RX ADMIN — OXYCODONE HYDROCHLORIDE AND ACETAMINOPHEN 1 TABLET: 7.5; 325 TABLET ORAL at 21:10

## 2017-05-07 RX ADMIN — ALPRAZOLAM 0.25 MG: 0.25 TABLET ORAL at 21:10

## 2017-05-07 RX ADMIN — ALPRAZOLAM 0.25 MG: 0.25 TABLET ORAL at 15:06

## 2017-05-07 RX ADMIN — METOCLOPRAMIDE 10 MG: 5 INJECTION, SOLUTION INTRAMUSCULAR; INTRAVENOUS at 07:03

## 2017-05-07 RX ADMIN — ENOXAPARIN SODIUM 40 MG: 40 INJECTION SUBCUTANEOUS at 08:10

## 2017-05-07 RX ADMIN — ENOXAPARIN SODIUM 40 MG: 40 INJECTION SUBCUTANEOUS at 23:12

## 2017-05-07 RX ADMIN — PANTOPRAZOLE SODIUM 40 MG: 40 TABLET, DELAYED RELEASE ORAL at 07:00

## 2017-05-07 RX ADMIN — OXYCODONE HYDROCHLORIDE AND ACETAMINOPHEN 1 TABLET: 7.5; 325 TABLET ORAL at 12:18

## 2017-05-07 RX ADMIN — OXYCODONE HYDROCHLORIDE AND ACETAMINOPHEN 1 TABLET: 7.5; 325 TABLET ORAL at 07:06

## 2017-05-07 RX ADMIN — POTASSIUM CHLORIDE AND SODIUM CHLORIDE 75 ML/HR: 900; 300 INJECTION, SOLUTION INTRAVENOUS at 08:09

## 2017-05-07 RX ADMIN — OXYCODONE HYDROCHLORIDE AND ACETAMINOPHEN 1 TABLET: 7.5; 325 TABLET ORAL at 16:42

## 2017-05-07 RX ADMIN — METOCLOPRAMIDE 10 MG: 5 INJECTION, SOLUTION INTRAMUSCULAR; INTRAVENOUS at 12:18

## 2017-05-07 RX ADMIN — ACETAMINOPHEN 650 MG: 325 TABLET, FILM COATED ORAL at 20:03

## 2017-05-07 RX ADMIN — METOCLOPRAMIDE 10 MG: 5 INJECTION, SOLUTION INTRAMUSCULAR; INTRAVENOUS at 18:36

## 2017-05-08 ENCOUNTER — APPOINTMENT (OUTPATIENT)
Dept: CT IMAGING | Facility: HOSPITAL | Age: 47
End: 2017-05-08
Attending: SURGERY

## 2017-05-08 LAB
ANION GAP SERPL CALCULATED.3IONS-SCNC: 12 MMOL/L
BASOPHILS # BLD AUTO: 0.01 10*3/MM3 (ref 0–0.2)
BASOPHILS NFR BLD AUTO: 0.1 % (ref 0–2)
BUN BLD-MCNC: 5 MG/DL (ref 6–20)
BUN/CREAT SERPL: 7.8 (ref 7–25)
CALCIUM SPEC-SCNC: 7.7 MG/DL (ref 8.6–10.5)
CHLORIDE SERPL-SCNC: 101 MMOL/L (ref 98–107)
CO2 SERPL-SCNC: 25 MMOL/L (ref 22–29)
CREAT BLD-MCNC: 0.64 MG/DL (ref 0.57–1)
DEPRECATED RDW RBC AUTO: 42 FL (ref 37–54)
EOSINOPHIL # BLD AUTO: 0.11 10*3/MM3 (ref 0.1–0.3)
EOSINOPHIL NFR BLD AUTO: 1.5 % (ref 0–4)
ERYTHROCYTE [DISTWIDTH] IN BLOOD BY AUTOMATED COUNT: 13.9 % (ref 11.5–14.5)
GFR SERPL CREATININE-BSD FRML MDRD: 121 ML/MIN/1.73
GLUCOSE BLD-MCNC: 117 MG/DL (ref 65–99)
HCT VFR BLD AUTO: 25.8 % (ref 37–47)
HGB BLD-MCNC: 8.7 G/DL (ref 12–16)
IMM GRANULOCYTES # BLD: 0.05 10*3/MM3 (ref 0–0.03)
IMM GRANULOCYTES NFR BLD: 0.7 % (ref 0–0.5)
LYMPHOCYTES # BLD AUTO: 1.3 10*3/MM3 (ref 0.6–4.8)
LYMPHOCYTES NFR BLD AUTO: 17.2 % (ref 20–45)
MAGNESIUM SERPL-MCNC: 1.6 MG/DL (ref 1.7–2.5)
MCH RBC QN AUTO: 28 PG (ref 27–31)
MCHC RBC AUTO-ENTMCNC: 33.7 G/DL (ref 31–37)
MCV RBC AUTO: 83 FL (ref 81–99)
MONOCYTES # BLD AUTO: 0.91 10*3/MM3 (ref 0–1)
MONOCYTES NFR BLD AUTO: 12.1 % (ref 3–8)
NEUTROPHILS # BLD AUTO: 5.16 10*3/MM3 (ref 1.5–8.3)
NEUTROPHILS NFR BLD AUTO: 68.4 % (ref 45–70)
NRBC BLD MANUAL-RTO: 0 /100 WBC (ref 0–0)
PLATELET # BLD AUTO: 297 10*3/MM3 (ref 140–500)
PMV BLD AUTO: 8.9 FL (ref 7.4–10.4)
POTASSIUM BLD-SCNC: 3.3 MMOL/L (ref 3.5–5.2)
RBC # BLD AUTO: 3.11 10*6/MM3 (ref 4.2–5.4)
SODIUM BLD-SCNC: 138 MMOL/L (ref 136–145)
WBC NRBC COR # BLD: 7.54 10*3/MM3 (ref 4.8–10.8)

## 2017-05-08 PROCEDURE — 25010000002 LEVOFLOXACIN PER 250 MG

## 2017-05-08 PROCEDURE — 83735 ASSAY OF MAGNESIUM: CPT | Performed by: HOSPITALIST

## 2017-05-08 PROCEDURE — 87015 SPECIMEN INFECT AGNT CONCNTJ: CPT | Performed by: SURGERY

## 2017-05-08 PROCEDURE — 99232 SBSQ HOSP IP/OBS MODERATE 35: CPT | Performed by: NURSE PRACTITIONER

## 2017-05-08 PROCEDURE — 87075 CULTR BACTERIA EXCEPT BLOOD: CPT | Performed by: SURGERY

## 2017-05-08 PROCEDURE — 99024 POSTOP FOLLOW-UP VISIT: CPT | Performed by: SURGERY

## 2017-05-08 PROCEDURE — 87040 BLOOD CULTURE FOR BACTERIA: CPT | Performed by: SURGERY

## 2017-05-08 PROCEDURE — 87070 CULTURE OTHR SPECIMN AEROBIC: CPT | Performed by: SURGERY

## 2017-05-08 PROCEDURE — 87205 SMEAR GRAM STAIN: CPT | Performed by: SURGERY

## 2017-05-08 PROCEDURE — 74177 CT ABD & PELVIS W/CONTRAST: CPT

## 2017-05-08 PROCEDURE — 25010000002 METOCLOPRAMIDE PER 10 MG: Performed by: SURGERY

## 2017-05-08 PROCEDURE — 80048 BASIC METABOLIC PNL TOTAL CA: CPT | Performed by: HOSPITALIST

## 2017-05-08 PROCEDURE — 25010000002 CEFTRIAXONE PER 250 MG: Performed by: NURSE PRACTITIONER

## 2017-05-08 PROCEDURE — 25010000002 ENOXAPARIN PER 10 MG: Performed by: SURGERY

## 2017-05-08 PROCEDURE — 0 IOPAMIDOL PER 1 ML: Performed by: SURGERY

## 2017-05-08 PROCEDURE — 85025 COMPLETE CBC W/AUTO DIFF WBC: CPT | Performed by: SURGERY

## 2017-05-08 RX ORDER — POTASSIUM CHLORIDE 20 MEQ/1
40 TABLET, EXTENDED RELEASE ORAL ONCE
Status: COMPLETED | OUTPATIENT
Start: 2017-05-08 | End: 2017-05-08

## 2017-05-08 RX ADMIN — OXYCODONE HYDROCHLORIDE AND ACETAMINOPHEN 1 TABLET: 7.5; 325 TABLET ORAL at 12:02

## 2017-05-08 RX ADMIN — METOCLOPRAMIDE 10 MG: 5 INJECTION, SOLUTION INTRAMUSCULAR; INTRAVENOUS at 12:27

## 2017-05-08 RX ADMIN — METRONIDAZOLE 500 MG: 500 INJECTION, SOLUTION INTRAVENOUS at 12:29

## 2017-05-08 RX ADMIN — MAGNESIUM OXIDE TAB 400 MG (241.3 MG ELEMENTAL MG) 400 MG: 400 (241.3 MG) TAB at 18:24

## 2017-05-08 RX ADMIN — METOCLOPRAMIDE 10 MG: 5 INJECTION, SOLUTION INTRAMUSCULAR; INTRAVENOUS at 01:02

## 2017-05-08 RX ADMIN — POTASSIUM CHLORIDE 40 MEQ: 1500 TABLET, EXTENDED RELEASE ORAL at 11:47

## 2017-05-08 RX ADMIN — AMITRIPTYLINE HYDROCHLORIDE 100 MG: 25 TABLET, FILM COATED ORAL at 21:09

## 2017-05-08 RX ADMIN — METOCLOPRAMIDE 10 MG: 5 INJECTION, SOLUTION INTRAMUSCULAR; INTRAVENOUS at 18:24

## 2017-05-08 RX ADMIN — LEVOFLOXACIN 500 MG: 5 INJECTION, SOLUTION INTRAVENOUS at 00:37

## 2017-05-08 RX ADMIN — LISINOPRIL 10 MG: 10 TABLET ORAL at 08:47

## 2017-05-08 RX ADMIN — ENOXAPARIN SODIUM 40 MG: 40 INJECTION SUBCUTANEOUS at 21:09

## 2017-05-08 RX ADMIN — METRONIDAZOLE 500 MG: 500 INJECTION, SOLUTION INTRAVENOUS at 18:25

## 2017-05-08 RX ADMIN — POTASSIUM CHLORIDE 40 MEQ: 20 TABLET, EXTENDED RELEASE ORAL at 08:45

## 2017-05-08 RX ADMIN — IOPAMIDOL 100 ML: 755 INJECTION, SOLUTION INTRAVENOUS at 14:15

## 2017-05-08 RX ADMIN — CEFTRIAXONE 2 G: 2 INJECTION, SOLUTION INTRAVENOUS at 11:46

## 2017-05-08 RX ADMIN — SERTRALINE 100 MG: 50 TABLET, FILM COATED ORAL at 08:47

## 2017-05-08 RX ADMIN — ENOXAPARIN SODIUM 40 MG: 40 INJECTION SUBCUTANEOUS at 08:45

## 2017-05-08 RX ADMIN — LEVOFLOXACIN 500 MG: 500 INJECTION, SOLUTION INTRAVENOUS at 00:37

## 2017-05-08 RX ADMIN — OXYCODONE HYDROCHLORIDE AND ACETAMINOPHEN 1 TABLET: 7.5; 325 TABLET ORAL at 21:09

## 2017-05-08 RX ADMIN — OXYCODONE HYDROCHLORIDE AND ACETAMINOPHEN 1 TABLET: 7.5; 325 TABLET ORAL at 05:33

## 2017-05-08 RX ADMIN — PANTOPRAZOLE SODIUM 40 MG: 40 TABLET, DELAYED RELEASE ORAL at 05:33

## 2017-05-08 RX ADMIN — MAGNESIUM OXIDE TAB 400 MG (241.3 MG ELEMENTAL MG) 400 MG: 400 (241.3 MG) TAB at 08:45

## 2017-05-08 RX ADMIN — OXYCODONE HYDROCHLORIDE AND ACETAMINOPHEN 1 TABLET: 7.5; 325 TABLET ORAL at 01:02

## 2017-05-08 RX ADMIN — ALPRAZOLAM 0.25 MG: 0.25 TABLET ORAL at 01:02

## 2017-05-08 RX ADMIN — METOCLOPRAMIDE 10 MG: 5 INJECTION, SOLUTION INTRAMUSCULAR; INTRAVENOUS at 07:00

## 2017-05-08 RX ADMIN — OXYCODONE HYDROCHLORIDE AND ACETAMINOPHEN 1 TABLET: 7.5; 325 TABLET ORAL at 16:46

## 2017-05-09 ENCOUNTER — APPOINTMENT (OUTPATIENT)
Dept: CT IMAGING | Facility: HOSPITAL | Age: 47
End: 2017-05-09
Attending: SURGERY

## 2017-05-09 LAB
ANION GAP SERPL CALCULATED.3IONS-SCNC: 9.6 MMOL/L
APTT PPP: 50.1 SECONDS (ref 24.3–38.1)
BASOPHILS # BLD AUTO: 0.02 10*3/MM3 (ref 0–0.2)
BASOPHILS NFR BLD AUTO: 0.3 % (ref 0–2)
BUN BLD-MCNC: 6 MG/DL (ref 6–20)
BUN/CREAT SERPL: 10 (ref 7–25)
CALCIUM SPEC-SCNC: 7.8 MG/DL (ref 8.6–10.5)
CHLORIDE SERPL-SCNC: 103 MMOL/L (ref 98–107)
CO2 SERPL-SCNC: 26.4 MMOL/L (ref 22–29)
CREAT BLD-MCNC: 0.6 MG/DL (ref 0.57–1)
DEPRECATED RDW RBC AUTO: 43.2 FL (ref 37–54)
EOSINOPHIL # BLD AUTO: 0.14 10*3/MM3 (ref 0.1–0.3)
EOSINOPHIL NFR BLD AUTO: 2.4 % (ref 0–4)
ERYTHROCYTE [DISTWIDTH] IN BLOOD BY AUTOMATED COUNT: 14.3 % (ref 11.5–14.5)
GFR SERPL CREATININE-BSD FRML MDRD: 130 ML/MIN/1.73
GLUCOSE BLD-MCNC: 108 MG/DL (ref 65–99)
HCT VFR BLD AUTO: 25.2 % (ref 37–47)
HGB BLD-MCNC: 8.3 G/DL (ref 12–16)
IMM GRANULOCYTES # BLD: 0.04 10*3/MM3 (ref 0–0.03)
IMM GRANULOCYTES NFR BLD: 0.7 % (ref 0–0.5)
INR PPP: 1.16 (ref 0.9–1.1)
LYMPHOCYTES # BLD AUTO: 1.55 10*3/MM3 (ref 0.6–4.8)
LYMPHOCYTES NFR BLD AUTO: 26.3 % (ref 20–45)
MAGNESIUM SERPL-MCNC: 1.8 MG/DL (ref 1.7–2.5)
MCH RBC QN AUTO: 27.7 PG (ref 27–31)
MCHC RBC AUTO-ENTMCNC: 32.9 G/DL (ref 31–37)
MCV RBC AUTO: 84 FL (ref 81–99)
MONOCYTES # BLD AUTO: 0.73 10*3/MM3 (ref 0–1)
MONOCYTES NFR BLD AUTO: 12.4 % (ref 3–8)
NEUTROPHILS # BLD AUTO: 3.42 10*3/MM3 (ref 1.5–8.3)
NEUTROPHILS NFR BLD AUTO: 57.9 % (ref 45–70)
NRBC BLD MANUAL-RTO: 0 /100 WBC (ref 0–0)
PLATELET # BLD AUTO: 310 10*3/MM3 (ref 140–500)
PMV BLD AUTO: 8.9 FL (ref 7.4–10.4)
POTASSIUM BLD-SCNC: 3.8 MMOL/L (ref 3.5–5.2)
PROTHROMBIN TIME: 14.9 SECONDS (ref 12.1–15)
RBC # BLD AUTO: 3 10*6/MM3 (ref 4.2–5.4)
SODIUM BLD-SCNC: 139 MMOL/L (ref 136–145)
WBC NRBC COR # BLD: 5.9 10*3/MM3 (ref 4.8–10.8)

## 2017-05-09 PROCEDURE — 87015 SPECIMEN INFECT AGNT CONCNTJ: CPT | Performed by: SURGERY

## 2017-05-09 PROCEDURE — 85730 THROMBOPLASTIN TIME PARTIAL: CPT | Performed by: SURGERY

## 2017-05-09 PROCEDURE — 25010000002 HYDROMORPHONE PER 4 MG: Performed by: SURGERY

## 2017-05-09 PROCEDURE — 99231 SBSQ HOSP IP/OBS SF/LOW 25: CPT | Performed by: HOSPITALIST

## 2017-05-09 PROCEDURE — 87070 CULTURE OTHR SPECIMN AEROBIC: CPT | Performed by: SURGERY

## 2017-05-09 PROCEDURE — 85610 PROTHROMBIN TIME: CPT | Performed by: SURGERY

## 2017-05-09 PROCEDURE — 99024 POSTOP FOLLOW-UP VISIT: CPT | Performed by: SURGERY

## 2017-05-09 PROCEDURE — 25010000002 ENOXAPARIN PER 10 MG: Performed by: SURGERY

## 2017-05-09 PROCEDURE — 0D9W30Z DRAINAGE OF PERITONEUM WITH DRAINAGE DEVICE, PERCUTANEOUS APPROACH: ICD-10-PCS | Performed by: RADIOLOGY

## 2017-05-09 PROCEDURE — 83735 ASSAY OF MAGNESIUM: CPT | Performed by: NURSE PRACTITIONER

## 2017-05-09 PROCEDURE — 87075 CULTR BACTERIA EXCEPT BLOOD: CPT | Performed by: SURGERY

## 2017-05-09 PROCEDURE — 25010000002 METOCLOPRAMIDE PER 10 MG: Performed by: SURGERY

## 2017-05-09 PROCEDURE — 75989 ABSCESS DRAINAGE UNDER X-RAY: CPT

## 2017-05-09 PROCEDURE — 25010000002 CEFTRIAXONE PER 250 MG: Performed by: NURSE PRACTITIONER

## 2017-05-09 PROCEDURE — 87206 SMEAR FLUORESCENT/ACID STAI: CPT | Performed by: SURGERY

## 2017-05-09 PROCEDURE — 87116 MYCOBACTERIA CULTURE: CPT | Performed by: SURGERY

## 2017-05-09 PROCEDURE — 85025 COMPLETE CBC W/AUTO DIFF WBC: CPT | Performed by: NURSE PRACTITIONER

## 2017-05-09 PROCEDURE — 87205 SMEAR GRAM STAIN: CPT | Performed by: SURGERY

## 2017-05-09 PROCEDURE — 80048 BASIC METABOLIC PNL TOTAL CA: CPT | Performed by: NURSE PRACTITIONER

## 2017-05-09 RX ORDER — DOCUSATE SODIUM 100 MG/1
100 CAPSULE, LIQUID FILLED ORAL 2 TIMES DAILY
Status: DISCONTINUED | OUTPATIENT
Start: 2017-05-09 | End: 2017-05-11 | Stop reason: HOSPADM

## 2017-05-09 RX ORDER — SENNA PLUS 8.6 MG/1
2 TABLET ORAL DAILY
Status: DISCONTINUED | OUTPATIENT
Start: 2017-05-09 | End: 2017-05-11 | Stop reason: HOSPADM

## 2017-05-09 RX ADMIN — OXYCODONE HYDROCHLORIDE AND ACETAMINOPHEN 1 TABLET: 7.5; 325 TABLET ORAL at 02:08

## 2017-05-09 RX ADMIN — METOCLOPRAMIDE 10 MG: 5 INJECTION, SOLUTION INTRAMUSCULAR; INTRAVENOUS at 02:04

## 2017-05-09 RX ADMIN — OXYCODONE HYDROCHLORIDE AND ACETAMINOPHEN 1 TABLET: 7.5; 325 TABLET ORAL at 15:57

## 2017-05-09 RX ADMIN — METRONIDAZOLE 500 MG: 500 INJECTION, SOLUTION INTRAVENOUS at 10:08

## 2017-05-09 RX ADMIN — ENOXAPARIN SODIUM 40 MG: 40 INJECTION SUBCUTANEOUS at 20:18

## 2017-05-09 RX ADMIN — OXYCODONE HYDROCHLORIDE AND ACETAMINOPHEN 1 TABLET: 7.5; 325 TABLET ORAL at 10:07

## 2017-05-09 RX ADMIN — ALPRAZOLAM 0.25 MG: 0.25 TABLET ORAL at 21:28

## 2017-05-09 RX ADMIN — METOCLOPRAMIDE 10 MG: 5 INJECTION, SOLUTION INTRAMUSCULAR; INTRAVENOUS at 08:38

## 2017-05-09 RX ADMIN — OXYCODONE HYDROCHLORIDE AND ACETAMINOPHEN 1 TABLET: 7.5; 325 TABLET ORAL at 20:17

## 2017-05-09 RX ADMIN — METOCLOPRAMIDE 10 MG: 5 INJECTION, SOLUTION INTRAMUSCULAR; INTRAVENOUS at 18:01

## 2017-05-09 RX ADMIN — HYDROMORPHONE HYDROCHLORIDE 1 MG: 1 INJECTION, SOLUTION INTRAMUSCULAR; INTRAVENOUS; SUBCUTANEOUS at 14:11

## 2017-05-09 RX ADMIN — ALPRAZOLAM 0.25 MG: 0.25 TABLET ORAL at 08:50

## 2017-05-09 RX ADMIN — MAGNESIUM OXIDE TAB 400 MG (241.3 MG ELEMENTAL MG) 400 MG: 400 (241.3 MG) TAB at 17:56

## 2017-05-09 RX ADMIN — METRONIDAZOLE 500 MG: 500 INJECTION, SOLUTION INTRAVENOUS at 02:19

## 2017-05-09 RX ADMIN — SERTRALINE 100 MG: 50 TABLET, FILM COATED ORAL at 08:38

## 2017-05-09 RX ADMIN — MAGNESIUM OXIDE TAB 400 MG (241.3 MG ELEMENTAL MG) 400 MG: 400 (241.3 MG) TAB at 08:39

## 2017-05-09 RX ADMIN — METOCLOPRAMIDE 10 MG: 5 INJECTION, SOLUTION INTRAMUSCULAR; INTRAVENOUS at 13:48

## 2017-05-09 RX ADMIN — CEFTRIAXONE 1 G: 1 INJECTION, SOLUTION INTRAVENOUS at 09:30

## 2017-05-09 RX ADMIN — PANTOPRAZOLE SODIUM 40 MG: 40 TABLET, DELAYED RELEASE ORAL at 05:50

## 2017-05-09 RX ADMIN — DOCUSATE SODIUM 100 MG: 100 CAPSULE, LIQUID FILLED ORAL at 17:54

## 2017-05-09 RX ADMIN — SENNOSIDES 2 TABLET: 8.6 TABLET, FILM COATED ORAL at 17:56

## 2017-05-09 RX ADMIN — OXYCODONE HYDROCHLORIDE AND ACETAMINOPHEN 1 TABLET: 7.5; 325 TABLET ORAL at 05:50

## 2017-05-09 RX ADMIN — AMITRIPTYLINE HYDROCHLORIDE 100 MG: 25 TABLET, FILM COATED ORAL at 20:17

## 2017-05-09 RX ADMIN — LISINOPRIL 10 MG: 10 TABLET ORAL at 08:39

## 2017-05-09 RX ADMIN — METRONIDAZOLE 500 MG: 500 INJECTION, SOLUTION INTRAVENOUS at 18:01

## 2017-05-10 LAB
ANION GAP SERPL CALCULATED.3IONS-SCNC: 15.1 MMOL/L
BASOPHILS # BLD AUTO: 0.03 10*3/MM3 (ref 0–0.2)
BASOPHILS NFR BLD AUTO: 0.4 % (ref 0–2)
BUN BLD-MCNC: 7 MG/DL (ref 6–20)
BUN/CREAT SERPL: 8.8 (ref 7–25)
CALCIUM SPEC-SCNC: 8.3 MG/DL (ref 8.6–10.5)
CHLORIDE SERPL-SCNC: 100 MMOL/L (ref 98–107)
CO2 SERPL-SCNC: 23.9 MMOL/L (ref 22–29)
CREAT BLD-MCNC: 0.8 MG/DL (ref 0.57–1)
DEPRECATED RDW RBC AUTO: 44.2 FL (ref 37–54)
EOSINOPHIL # BLD AUTO: 0.16 10*3/MM3 (ref 0.1–0.3)
EOSINOPHIL NFR BLD AUTO: 2.2 % (ref 0–4)
ERYTHROCYTE [DISTWIDTH] IN BLOOD BY AUTOMATED COUNT: 14.2 % (ref 11.5–14.5)
GFR SERPL CREATININE-BSD FRML MDRD: 93 ML/MIN/1.73
GLUCOSE BLD-MCNC: 145 MG/DL (ref 65–99)
HCT VFR BLD AUTO: 30 % (ref 37–47)
HGB BLD-MCNC: 9.9 G/DL (ref 12–16)
IMM GRANULOCYTES # BLD: 0.03 10*3/MM3 (ref 0–0.03)
IMM GRANULOCYTES NFR BLD: 0.4 % (ref 0–0.5)
LYMPHOCYTES # BLD AUTO: 1.33 10*3/MM3 (ref 0.6–4.8)
LYMPHOCYTES NFR BLD AUTO: 18.4 % (ref 20–45)
MAGNESIUM SERPL-MCNC: 1.8 MG/DL (ref 1.7–2.5)
MCH RBC QN AUTO: 28 PG (ref 27–31)
MCHC RBC AUTO-ENTMCNC: 33 G/DL (ref 31–37)
MCV RBC AUTO: 85 FL (ref 81–99)
MONOCYTES # BLD AUTO: 0.85 10*3/MM3 (ref 0–1)
MONOCYTES NFR BLD AUTO: 11.8 % (ref 3–8)
NEUTROPHILS # BLD AUTO: 4.83 10*3/MM3 (ref 1.5–8.3)
NEUTROPHILS NFR BLD AUTO: 66.8 % (ref 45–70)
NRBC BLD MANUAL-RTO: 0 /100 WBC (ref 0–0)
PLATELET # BLD AUTO: 443 10*3/MM3 (ref 140–500)
PMV BLD AUTO: 9.2 FL (ref 7.4–10.4)
POTASSIUM BLD-SCNC: 3.7 MMOL/L (ref 3.5–5.2)
RBC # BLD AUTO: 3.53 10*6/MM3 (ref 4.2–5.4)
SODIUM BLD-SCNC: 139 MMOL/L (ref 136–145)
WBC NRBC COR # BLD: 7.23 10*3/MM3 (ref 4.8–10.8)

## 2017-05-10 PROCEDURE — 83735 ASSAY OF MAGNESIUM: CPT | Performed by: SURGERY

## 2017-05-10 PROCEDURE — 99024 POSTOP FOLLOW-UP VISIT: CPT | Performed by: SURGERY

## 2017-05-10 PROCEDURE — 99231 SBSQ HOSP IP/OBS SF/LOW 25: CPT | Performed by: HOSPITALIST

## 2017-05-10 PROCEDURE — 25010000002 CEFTRIAXONE PER 250 MG: Performed by: NURSE PRACTITIONER

## 2017-05-10 PROCEDURE — 25010000002 METOCLOPRAMIDE PER 10 MG: Performed by: SURGERY

## 2017-05-10 PROCEDURE — 25010000002 ENOXAPARIN PER 10 MG: Performed by: SURGERY

## 2017-05-10 PROCEDURE — 85025 COMPLETE CBC W/AUTO DIFF WBC: CPT | Performed by: SURGERY

## 2017-05-10 PROCEDURE — 94799 UNLISTED PULMONARY SVC/PX: CPT

## 2017-05-10 PROCEDURE — 80048 BASIC METABOLIC PNL TOTAL CA: CPT | Performed by: SURGERY

## 2017-05-10 RX ORDER — BISACODYL 10 MG
10 SUPPOSITORY, RECTAL RECTAL DAILY
Status: DISCONTINUED | OUTPATIENT
Start: 2017-05-10 | End: 2017-05-11 | Stop reason: HOSPADM

## 2017-05-10 RX ORDER — METRONIDAZOLE 500 MG/1
500 TABLET ORAL EVERY 8 HOURS SCHEDULED
Status: DISCONTINUED | OUTPATIENT
Start: 2017-05-10 | End: 2017-05-11 | Stop reason: HOSPADM

## 2017-05-10 RX ADMIN — OXYCODONE HYDROCHLORIDE AND ACETAMINOPHEN 1 TABLET: 7.5; 325 TABLET ORAL at 00:20

## 2017-05-10 RX ADMIN — METOCLOPRAMIDE 10 MG: 5 INJECTION, SOLUTION INTRAMUSCULAR; INTRAVENOUS at 18:15

## 2017-05-10 RX ADMIN — ENOXAPARIN SODIUM 40 MG: 40 INJECTION SUBCUTANEOUS at 20:22

## 2017-05-10 RX ADMIN — MAGNESIUM OXIDE TAB 400 MG (241.3 MG ELEMENTAL MG) 400 MG: 400 (241.3 MG) TAB at 09:40

## 2017-05-10 RX ADMIN — METRONIDAZOLE 500 MG: 500 TABLET ORAL at 22:16

## 2017-05-10 RX ADMIN — METRONIDAZOLE 500 MG: 500 INJECTION, SOLUTION INTRAVENOUS at 03:03

## 2017-05-10 RX ADMIN — ALPRAZOLAM 0.25 MG: 0.25 TABLET ORAL at 10:27

## 2017-05-10 RX ADMIN — OXYCODONE HYDROCHLORIDE AND ACETAMINOPHEN 1 TABLET: 7.5; 325 TABLET ORAL at 18:15

## 2017-05-10 RX ADMIN — ACETAMINOPHEN 650 MG: 325 TABLET, FILM COATED ORAL at 19:40

## 2017-05-10 RX ADMIN — CEFTRIAXONE 1 G: 1 INJECTION, SOLUTION INTRAVENOUS at 10:26

## 2017-05-10 RX ADMIN — DOCUSATE SODIUM 100 MG: 100 CAPSULE, LIQUID FILLED ORAL at 09:40

## 2017-05-10 RX ADMIN — Medication 10 ML: at 13:49

## 2017-05-10 RX ADMIN — AMITRIPTYLINE HYDROCHLORIDE 100 MG: 25 TABLET, FILM COATED ORAL at 20:22

## 2017-05-10 RX ADMIN — METOCLOPRAMIDE 10 MG: 5 INJECTION, SOLUTION INTRAMUSCULAR; INTRAVENOUS at 13:10

## 2017-05-10 RX ADMIN — LISINOPRIL 10 MG: 10 TABLET ORAL at 09:40

## 2017-05-10 RX ADMIN — SERTRALINE 100 MG: 50 TABLET, FILM COATED ORAL at 09:39

## 2017-05-10 RX ADMIN — MAGNESIUM OXIDE TAB 400 MG (241.3 MG ELEMENTAL MG) 400 MG: 400 (241.3 MG) TAB at 18:15

## 2017-05-10 RX ADMIN — DOCUSATE SODIUM 100 MG: 100 CAPSULE, LIQUID FILLED ORAL at 18:15

## 2017-05-10 RX ADMIN — METOCLOPRAMIDE 10 MG: 5 INJECTION, SOLUTION INTRAMUSCULAR; INTRAVENOUS at 06:18

## 2017-05-10 RX ADMIN — METRONIDAZOLE 500 MG: 500 INJECTION, SOLUTION INTRAVENOUS at 10:26

## 2017-05-10 RX ADMIN — SENNOSIDES 2 TABLET: 8.6 TABLET, FILM COATED ORAL at 09:40

## 2017-05-10 RX ADMIN — METOCLOPRAMIDE 10 MG: 5 INJECTION, SOLUTION INTRAMUSCULAR; INTRAVENOUS at 00:20

## 2017-05-10 RX ADMIN — PANTOPRAZOLE SODIUM 40 MG: 40 TABLET, DELAYED RELEASE ORAL at 06:18

## 2017-05-10 RX ADMIN — BISACODYL 10 MG: 10 SUPPOSITORY RECTAL at 10:26

## 2017-05-10 RX ADMIN — OXYCODONE HYDROCHLORIDE AND ACETAMINOPHEN 1 TABLET: 7.5; 325 TABLET ORAL at 14:02

## 2017-05-10 RX ADMIN — OXYCODONE HYDROCHLORIDE AND ACETAMINOPHEN 1 TABLET: 7.5; 325 TABLET ORAL at 09:40

## 2017-05-10 RX ADMIN — METRONIDAZOLE 500 MG: 500 TABLET ORAL at 13:09

## 2017-05-10 RX ADMIN — OXYCODONE HYDROCHLORIDE AND ACETAMINOPHEN 1 TABLET: 7.5; 325 TABLET ORAL at 04:28

## 2017-05-10 RX ADMIN — OXYCODONE HYDROCHLORIDE AND ACETAMINOPHEN 1 TABLET: 7.5; 325 TABLET ORAL at 22:13

## 2017-05-10 RX ADMIN — ENOXAPARIN SODIUM 40 MG: 40 INJECTION SUBCUTANEOUS at 09:40

## 2017-05-11 ENCOUNTER — APPOINTMENT (OUTPATIENT)
Dept: GENERAL RADIOLOGY | Facility: HOSPITAL | Age: 47
End: 2017-05-11

## 2017-05-11 VITALS
TEMPERATURE: 98.9 F | HEIGHT: 60 IN | HEART RATE: 97 BPM | DIASTOLIC BLOOD PRESSURE: 76 MMHG | OXYGEN SATURATION: 99 % | BODY MASS INDEX: 50.93 KG/M2 | WEIGHT: 259.4 LBS | RESPIRATION RATE: 20 BRPM | SYSTOLIC BLOOD PRESSURE: 126 MMHG

## 2017-05-11 LAB
ANION GAP SERPL CALCULATED.3IONS-SCNC: 12.1 MMOL/L
BACTERIA FLD CULT: NORMAL
BACTERIA SPEC AEROBE CULT: NORMAL
BASOPHILS # BLD AUTO: 0.03 10*3/MM3 (ref 0–0.2)
BASOPHILS NFR BLD AUTO: 0.5 % (ref 0–2)
BUN BLD-MCNC: 8 MG/DL (ref 6–20)
BUN/CREAT SERPL: 11.6 (ref 7–25)
CALCIUM SPEC-SCNC: 8.4 MG/DL (ref 8.6–10.5)
CHLORIDE SERPL-SCNC: 101 MMOL/L (ref 98–107)
CO2 SERPL-SCNC: 24.9 MMOL/L (ref 22–29)
CREAT BLD-MCNC: 0.69 MG/DL (ref 0.57–1)
DEPRECATED RDW RBC AUTO: 43.2 FL (ref 37–54)
EOSINOPHIL # BLD AUTO: 0.16 10*3/MM3 (ref 0.1–0.3)
EOSINOPHIL NFR BLD AUTO: 2.7 % (ref 0–4)
ERYTHROCYTE [DISTWIDTH] IN BLOOD BY AUTOMATED COUNT: 14.2 % (ref 11.5–14.5)
GFR SERPL CREATININE-BSD FRML MDRD: 111 ML/MIN/1.73
GLUCOSE BLD-MCNC: 119 MG/DL (ref 65–99)
GRAM STN SPEC: NORMAL
HCT VFR BLD AUTO: 28.8 % (ref 37–47)
HGB BLD-MCNC: 9.5 G/DL (ref 12–16)
IMM GRANULOCYTES # BLD: 0.03 10*3/MM3 (ref 0–0.03)
IMM GRANULOCYTES NFR BLD: 0.5 % (ref 0–0.5)
LYMPHOCYTES # BLD AUTO: 1.6 10*3/MM3 (ref 0.6–4.8)
LYMPHOCYTES NFR BLD AUTO: 27.1 % (ref 20–45)
MAGNESIUM SERPL-MCNC: 2 MG/DL (ref 1.7–2.5)
MCH RBC QN AUTO: 27.7 PG (ref 27–31)
MCHC RBC AUTO-ENTMCNC: 33 G/DL (ref 31–37)
MCV RBC AUTO: 84 FL (ref 81–99)
MONOCYTES # BLD AUTO: 0.81 10*3/MM3 (ref 0–1)
MONOCYTES NFR BLD AUTO: 13.7 % (ref 3–8)
NEUTROPHILS # BLD AUTO: 3.28 10*3/MM3 (ref 1.5–8.3)
NEUTROPHILS NFR BLD AUTO: 55.5 % (ref 45–70)
NRBC BLD MANUAL-RTO: 0 /100 WBC (ref 0–0)
PLATELET # BLD AUTO: 433 10*3/MM3 (ref 140–500)
PMV BLD AUTO: 8.5 FL (ref 7.4–10.4)
POTASSIUM BLD-SCNC: 3.8 MMOL/L (ref 3.5–5.2)
RBC # BLD AUTO: 3.43 10*6/MM3 (ref 4.2–5.4)
SODIUM BLD-SCNC: 138 MMOL/L (ref 136–145)
WBC NRBC COR # BLD: 5.91 10*3/MM3 (ref 4.8–10.8)

## 2017-05-11 PROCEDURE — 25010000002 METOCLOPRAMIDE PER 10 MG: Performed by: SURGERY

## 2017-05-11 PROCEDURE — 85025 COMPLETE CBC W/AUTO DIFF WBC: CPT | Performed by: SURGERY

## 2017-05-11 PROCEDURE — 80048 BASIC METABOLIC PNL TOTAL CA: CPT | Performed by: SURGERY

## 2017-05-11 PROCEDURE — 83735 ASSAY OF MAGNESIUM: CPT | Performed by: SURGERY

## 2017-05-11 PROCEDURE — 71020 HC CHEST PA AND LATERAL: CPT

## 2017-05-11 PROCEDURE — 25010000002 CEFTRIAXONE PER 250 MG: Performed by: INTERNAL MEDICINE

## 2017-05-11 PROCEDURE — 25010000002 ENOXAPARIN PER 10 MG: Performed by: SURGERY

## 2017-05-11 PROCEDURE — 99024 POSTOP FOLLOW-UP VISIT: CPT | Performed by: SURGERY

## 2017-05-11 RX ORDER — ONDANSETRON 4 MG/1
4 TABLET, ORALLY DISINTEGRATING ORAL EVERY 6 HOURS PRN
Qty: 20 TABLET | Refills: 0 | Status: SHIPPED | OUTPATIENT
Start: 2017-05-11

## 2017-05-11 RX ORDER — SENNA PLUS 8.6 MG/1
2 TABLET ORAL DAILY
Qty: 60 TABLET | Refills: 0 | Status: SHIPPED | OUTPATIENT
Start: 2017-05-11

## 2017-05-11 RX ORDER — METRONIDAZOLE 500 MG/1
500 TABLET ORAL EVERY 8 HOURS SCHEDULED
Qty: 75 TABLET | Refills: 0 | Status: SHIPPED | OUTPATIENT
Start: 2017-05-11 | End: 2017-06-05

## 2017-05-11 RX ORDER — DOCUSATE SODIUM 100 MG/1
100 CAPSULE, LIQUID FILLED ORAL 2 TIMES DAILY PRN
Qty: 60 CAPSULE | Refills: 0 | Status: SHIPPED | OUTPATIENT
Start: 2017-05-11 | End: 2017-06-10

## 2017-05-11 RX ORDER — OXYCODONE AND ACETAMINOPHEN 7.5; 325 MG/1; MG/1
1 TABLET ORAL EVERY 6 HOURS PRN
Qty: 30 TABLET | Refills: 0 | Status: SHIPPED | OUTPATIENT
Start: 2017-05-11 | End: 2017-09-20

## 2017-05-11 RX ORDER — ONDANSETRON 4 MG/1
4 TABLET, ORALLY DISINTEGRATING ORAL EVERY 6 HOURS PRN
Qty: 20 TABLET | Refills: 0 | Status: SHIPPED | OUTPATIENT
Start: 2017-05-11 | End: 2017-05-19

## 2017-05-11 RX ADMIN — METRONIDAZOLE 500 MG: 500 TABLET ORAL at 06:34

## 2017-05-11 RX ADMIN — MAGNESIUM OXIDE TAB 400 MG (241.3 MG ELEMENTAL MG) 400 MG: 400 (241.3 MG) TAB at 08:51

## 2017-05-11 RX ADMIN — PANTOPRAZOLE SODIUM 40 MG: 40 TABLET, DELAYED RELEASE ORAL at 06:26

## 2017-05-11 RX ADMIN — ENOXAPARIN SODIUM 40 MG: 40 INJECTION SUBCUTANEOUS at 08:50

## 2017-05-11 RX ADMIN — METOCLOPRAMIDE 10 MG: 5 INJECTION, SOLUTION INTRAMUSCULAR; INTRAVENOUS at 06:26

## 2017-05-11 RX ADMIN — OXYCODONE HYDROCHLORIDE AND ACETAMINOPHEN 1 TABLET: 7.5; 325 TABLET ORAL at 06:26

## 2017-05-11 RX ADMIN — DOCUSATE SODIUM 100 MG: 100 CAPSULE, LIQUID FILLED ORAL at 08:50

## 2017-05-11 RX ADMIN — OXYCODONE HYDROCHLORIDE AND ACETAMINOPHEN 1 TABLET: 7.5; 325 TABLET ORAL at 12:03

## 2017-05-11 RX ADMIN — SERTRALINE 100 MG: 50 TABLET, FILM COATED ORAL at 08:50

## 2017-05-11 RX ADMIN — CEFTRIAXONE 2 G: 2 INJECTION, SOLUTION INTRAVENOUS at 12:02

## 2017-05-11 RX ADMIN — SENNOSIDES 2 TABLET: 8.6 TABLET, FILM COATED ORAL at 08:50

## 2017-05-11 RX ADMIN — LISINOPRIL 10 MG: 10 TABLET ORAL at 08:50

## 2017-05-11 RX ADMIN — METOCLOPRAMIDE 10 MG: 5 INJECTION, SOLUTION INTRAMUSCULAR; INTRAVENOUS at 12:03

## 2017-05-11 RX ADMIN — METOCLOPRAMIDE 10 MG: 5 INJECTION, SOLUTION INTRAMUSCULAR; INTRAVENOUS at 01:56

## 2017-05-12 LAB
BACTERIA FLD CULT: NORMAL
GRAM STN SPEC: NORMAL
GRAM STN SPEC: NORMAL

## 2017-05-13 LAB
BACTERIA SPEC AEROBE CULT: NORMAL
BACTERIA SPEC AEROBE CULT: NORMAL
BACTERIA SPEC ANAEROBE CULT: NORMAL

## 2017-05-14 LAB — BACTERIA SPEC ANAEROBE CULT: NORMAL

## 2017-05-15 ENCOUNTER — LAB REQUISITION (OUTPATIENT)
Dept: LAB | Facility: HOSPITAL | Age: 47
End: 2017-05-15

## 2017-05-15 DIAGNOSIS — K56.609 INTESTINAL OBSTRUCTION (HCC): ICD-10-CM

## 2017-05-15 LAB
ANION GAP SERPL CALCULATED.3IONS-SCNC: 13.4 MMOL/L
BASOPHILS # BLD AUTO: 0.04 10*3/MM3 (ref 0–0.2)
BASOPHILS NFR BLD AUTO: 0.6 % (ref 0–2)
BUN BLD-MCNC: 7 MG/DL (ref 6–20)
BUN/CREAT SERPL: 9.3 (ref 7–25)
CALCIUM SPEC-SCNC: 8.5 MG/DL (ref 8.6–10.5)
CHLORIDE SERPL-SCNC: 101 MMOL/L (ref 98–107)
CO2 SERPL-SCNC: 26.6 MMOL/L (ref 22–29)
CREAT BLD-MCNC: 0.75 MG/DL (ref 0.57–1)
DEPRECATED RDW RBC AUTO: 46.5 FL (ref 37–54)
EOSINOPHIL # BLD AUTO: 0.11 10*3/MM3 (ref 0.1–0.3)
EOSINOPHIL NFR BLD AUTO: 1.7 % (ref 0–4)
ERYTHROCYTE [DISTWIDTH] IN BLOOD BY AUTOMATED COUNT: 14.6 % (ref 11.5–14.5)
GFR SERPL CREATININE-BSD FRML MDRD: 100 ML/MIN/1.73
GLUCOSE BLD-MCNC: 99 MG/DL (ref 65–99)
HCT VFR BLD AUTO: 31 % (ref 37–47)
HGB BLD-MCNC: 9.8 G/DL (ref 12–16)
IMM GRANULOCYTES # BLD: 0.06 10*3/MM3 (ref 0–0.03)
IMM GRANULOCYTES NFR BLD: 0.9 % (ref 0–0.5)
LYMPHOCYTES # BLD AUTO: 1.55 10*3/MM3 (ref 0.6–4.8)
LYMPHOCYTES NFR BLD AUTO: 24.1 % (ref 20–45)
MCH RBC QN AUTO: 27.5 PG (ref 27–31)
MCHC RBC AUTO-ENTMCNC: 31.6 G/DL (ref 31–37)
MCV RBC AUTO: 87.1 FL (ref 81–99)
MONOCYTES # BLD AUTO: 0.62 10*3/MM3 (ref 0–1)
MONOCYTES NFR BLD AUTO: 9.6 % (ref 3–8)
NEUTROPHILS # BLD AUTO: 4.06 10*3/MM3 (ref 1.5–8.3)
NEUTROPHILS NFR BLD AUTO: 63.1 % (ref 45–70)
NRBC BLD MANUAL-RTO: 0 /100 WBC (ref 0–0)
PLATELET # BLD AUTO: 534 10*3/MM3 (ref 140–500)
PMV BLD AUTO: 8.5 FL (ref 7.4–10.4)
POTASSIUM BLD-SCNC: 3.5 MMOL/L (ref 3.5–5.2)
RBC # BLD AUTO: 3.56 10*6/MM3 (ref 4.2–5.4)
SODIUM BLD-SCNC: 141 MMOL/L (ref 136–145)
WBC NRBC COR # BLD: 6.44 10*3/MM3 (ref 4.8–10.8)

## 2017-05-15 PROCEDURE — 80048 BASIC METABOLIC PNL TOTAL CA: CPT | Performed by: INTERNAL MEDICINE

## 2017-05-15 PROCEDURE — 85025 COMPLETE CBC W/AUTO DIFF WBC: CPT | Performed by: INTERNAL MEDICINE

## 2017-05-19 ENCOUNTER — OFFICE VISIT (OUTPATIENT)
Dept: SURGERY | Facility: CLINIC | Age: 47
End: 2017-05-19

## 2017-05-19 VITALS
HEIGHT: 60 IN | WEIGHT: 245 LBS | BODY MASS INDEX: 48.1 KG/M2 | SYSTOLIC BLOOD PRESSURE: 120 MMHG | DIASTOLIC BLOOD PRESSURE: 82 MMHG

## 2017-05-19 DIAGNOSIS — Z09 SURGERY FOLLOW-UP: Primary | ICD-10-CM

## 2017-05-19 PROCEDURE — 99024 POSTOP FOLLOW-UP VISIT: CPT | Performed by: SURGERY

## 2017-05-22 ENCOUNTER — LAB REQUISITION (OUTPATIENT)
Dept: LAB | Facility: HOSPITAL | Age: 47
End: 2017-05-22

## 2017-05-22 ENCOUNTER — TELEPHONE (OUTPATIENT)
Dept: SURGERY | Facility: CLINIC | Age: 47
End: 2017-05-22

## 2017-05-22 ENCOUNTER — OFFICE VISIT (OUTPATIENT)
Dept: SURGERY | Facility: CLINIC | Age: 47
End: 2017-05-22

## 2017-05-22 DIAGNOSIS — Z09 FOLLOW UP: Primary | ICD-10-CM

## 2017-05-22 DIAGNOSIS — T81.40XA INFECTION FOLLOWING PROCEDURE: ICD-10-CM

## 2017-05-22 LAB
ANION GAP SERPL CALCULATED.3IONS-SCNC: 14 MMOL/L
BASOPHILS # BLD AUTO: 0.04 10*3/MM3 (ref 0–0.2)
BASOPHILS NFR BLD AUTO: 0.6 % (ref 0–2)
BUN BLD-MCNC: 11 MG/DL (ref 6–20)
BUN/CREAT SERPL: 12.1 (ref 7–25)
CALCIUM SPEC-SCNC: 8.7 MG/DL (ref 8.6–10.5)
CHLORIDE SERPL-SCNC: 95 MMOL/L (ref 98–107)
CO2 SERPL-SCNC: 30 MMOL/L (ref 22–29)
CREAT BLD-MCNC: 0.91 MG/DL (ref 0.57–1)
DEPRECATED RDW RBC AUTO: 42.9 FL (ref 37–54)
EOSINOPHIL # BLD AUTO: 0.09 10*3/MM3 (ref 0.1–0.3)
EOSINOPHIL NFR BLD AUTO: 1.3 % (ref 0–4)
ERYTHROCYTE [DISTWIDTH] IN BLOOD BY AUTOMATED COUNT: 13.9 % (ref 11.5–14.5)
GFR SERPL CREATININE-BSD FRML MDRD: 80 ML/MIN/1.73
GLUCOSE BLD-MCNC: 106 MG/DL (ref 65–99)
HCT VFR BLD AUTO: 35.1 % (ref 37–47)
HGB BLD-MCNC: 11.2 G/DL (ref 12–16)
IMM GRANULOCYTES # BLD: 0.02 10*3/MM3 (ref 0–0.03)
IMM GRANULOCYTES NFR BLD: 0.3 % (ref 0–0.5)
LYMPHOCYTES # BLD AUTO: 2.02 10*3/MM3 (ref 0.6–4.8)
LYMPHOCYTES NFR BLD AUTO: 28.3 % (ref 20–45)
MCH RBC QN AUTO: 27.1 PG (ref 27–31)
MCHC RBC AUTO-ENTMCNC: 31.9 G/DL (ref 31–37)
MCV RBC AUTO: 85 FL (ref 81–99)
MONOCYTES # BLD AUTO: 0.69 10*3/MM3 (ref 0–1)
MONOCYTES NFR BLD AUTO: 9.7 % (ref 3–8)
NEUTROPHILS # BLD AUTO: 4.27 10*3/MM3 (ref 1.5–8.3)
NEUTROPHILS NFR BLD AUTO: 59.8 % (ref 45–70)
NRBC BLD MANUAL-RTO: 0 /100 WBC (ref 0–0)
PLATELET # BLD AUTO: 471 10*3/MM3 (ref 140–500)
PMV BLD AUTO: 8.9 FL (ref 7.4–10.4)
POTASSIUM BLD-SCNC: 4.1 MMOL/L (ref 3.5–5.2)
RBC # BLD AUTO: 4.13 10*6/MM3 (ref 4.2–5.4)
SODIUM BLD-SCNC: 139 MMOL/L (ref 136–145)
WBC NRBC COR # BLD: 7.13 10*3/MM3 (ref 4.8–10.8)

## 2017-05-22 PROCEDURE — 85025 COMPLETE CBC W/AUTO DIFF WBC: CPT | Performed by: INTERNAL MEDICINE

## 2017-05-22 PROCEDURE — 99024 POSTOP FOLLOW-UP VISIT: CPT | Performed by: SURGERY

## 2017-05-22 PROCEDURE — 80048 BASIC METABOLIC PNL TOTAL CA: CPT | Performed by: INTERNAL MEDICINE

## 2017-05-24 ENCOUNTER — OFFICE VISIT (OUTPATIENT)
Dept: SURGERY | Facility: CLINIC | Age: 47
End: 2017-05-24

## 2017-05-24 VITALS
HEIGHT: 60 IN | WEIGHT: 245 LBS | BODY MASS INDEX: 48.1 KG/M2 | DIASTOLIC BLOOD PRESSURE: 80 MMHG | SYSTOLIC BLOOD PRESSURE: 122 MMHG

## 2017-05-24 DIAGNOSIS — Z09 SURGERY FOLLOW-UP: Primary | ICD-10-CM

## 2017-05-24 PROCEDURE — 99024 POSTOP FOLLOW-UP VISIT: CPT | Performed by: SURGERY

## 2017-05-30 ENCOUNTER — LAB REQUISITION (OUTPATIENT)
Dept: LAB | Facility: HOSPITAL | Age: 47
End: 2017-05-30

## 2017-05-30 DIAGNOSIS — T81.40XA INFECTION FOLLOWING PROCEDURE: ICD-10-CM

## 2017-05-30 LAB
ANION GAP SERPL CALCULATED.3IONS-SCNC: 16.2 MMOL/L
BASOPHILS # BLD AUTO: 0.02 10*3/MM3 (ref 0–0.2)
BASOPHILS NFR BLD AUTO: 0.4 % (ref 0–2)
BUN BLD-MCNC: 9 MG/DL (ref 6–20)
BUN/CREAT SERPL: 10.3 (ref 7–25)
CALCIUM SPEC-SCNC: 8.3 MG/DL (ref 8.6–10.5)
CHLORIDE SERPL-SCNC: 101 MMOL/L (ref 98–107)
CO2 SERPL-SCNC: 24.8 MMOL/L (ref 22–29)
CREAT BLD-MCNC: 0.87 MG/DL (ref 0.57–1)
DEPRECATED RDW RBC AUTO: 45 FL (ref 37–54)
EOSINOPHIL # BLD AUTO: 0.23 10*3/MM3 (ref 0.1–0.3)
EOSINOPHIL NFR BLD AUTO: 4.4 % (ref 0–4)
ERYTHROCYTE [DISTWIDTH] IN BLOOD BY AUTOMATED COUNT: 14.3 % (ref 11.5–14.5)
GFR SERPL CREATININE-BSD FRML MDRD: 84 ML/MIN/1.73
GLUCOSE BLD-MCNC: 109 MG/DL (ref 65–99)
HCT VFR BLD AUTO: 33.1 % (ref 37–47)
HGB BLD-MCNC: 10.4 G/DL (ref 12–16)
IMM GRANULOCYTES # BLD: 0.02 10*3/MM3 (ref 0–0.03)
IMM GRANULOCYTES NFR BLD: 0.4 % (ref 0–0.5)
LYMPHOCYTES # BLD AUTO: 1.17 10*3/MM3 (ref 0.6–4.8)
LYMPHOCYTES NFR BLD AUTO: 22.4 % (ref 20–45)
MCH RBC QN AUTO: 27.2 PG (ref 27–31)
MCHC RBC AUTO-ENTMCNC: 31.4 G/DL (ref 31–37)
MCV RBC AUTO: 86.4 FL (ref 81–99)
MONOCYTES # BLD AUTO: 0.47 10*3/MM3 (ref 0–1)
MONOCYTES NFR BLD AUTO: 9 % (ref 3–8)
NEUTROPHILS # BLD AUTO: 3.32 10*3/MM3 (ref 1.5–8.3)
NEUTROPHILS NFR BLD AUTO: 63.4 % (ref 45–70)
NRBC BLD MANUAL-RTO: 0 /100 WBC (ref 0–0)
PLATELET # BLD AUTO: 297 10*3/MM3 (ref 140–500)
PMV BLD AUTO: 9.1 FL (ref 7.4–10.4)
POTASSIUM BLD-SCNC: 3.2 MMOL/L (ref 3.5–5.2)
RBC # BLD AUTO: 3.83 10*6/MM3 (ref 4.2–5.4)
SODIUM BLD-SCNC: 142 MMOL/L (ref 136–145)
WBC NRBC COR # BLD: 5.23 10*3/MM3 (ref 4.8–10.8)

## 2017-05-30 PROCEDURE — 80048 BASIC METABOLIC PNL TOTAL CA: CPT | Performed by: INTERNAL MEDICINE

## 2017-05-30 PROCEDURE — 85025 COMPLETE CBC W/AUTO DIFF WBC: CPT | Performed by: INTERNAL MEDICINE

## 2017-06-05 ENCOUNTER — LAB REQUISITION (OUTPATIENT)
Dept: LAB | Facility: HOSPITAL | Age: 47
End: 2017-06-05

## 2017-06-05 DIAGNOSIS — T81.40XA INFECTION FOLLOWING PROCEDURE: ICD-10-CM

## 2017-06-05 LAB
ANION GAP SERPL CALCULATED.3IONS-SCNC: 16.8 MMOL/L
BASOPHILS # BLD AUTO: 0.02 10*3/MM3 (ref 0–0.2)
BASOPHILS NFR BLD AUTO: 0.4 % (ref 0–2)
BUN BLD-MCNC: 8 MG/DL (ref 6–20)
BUN/CREAT SERPL: 11.6 (ref 7–25)
CALCIUM SPEC-SCNC: 8.5 MG/DL (ref 8.6–10.5)
CHLORIDE SERPL-SCNC: 100 MMOL/L (ref 98–107)
CO2 SERPL-SCNC: 25.2 MMOL/L (ref 22–29)
CREAT BLD-MCNC: 0.69 MG/DL (ref 0.57–1)
DEPRECATED RDW RBC AUTO: 43.2 FL (ref 37–54)
EOSINOPHIL # BLD AUTO: 0.07 10*3/MM3 (ref 0.1–0.3)
EOSINOPHIL NFR BLD AUTO: 1.3 % (ref 0–4)
ERYTHROCYTE [DISTWIDTH] IN BLOOD BY AUTOMATED COUNT: 14 % (ref 11.5–14.5)
GFR SERPL CREATININE-BSD FRML MDRD: 110 ML/MIN/1.73
GLUCOSE BLD-MCNC: 77 MG/DL (ref 65–99)
HCT VFR BLD AUTO: 31.7 % (ref 37–47)
HGB BLD-MCNC: 10.4 G/DL (ref 12–16)
IMM GRANULOCYTES # BLD: 0.01 10*3/MM3 (ref 0–0.03)
IMM GRANULOCYTES NFR BLD: 0.2 % (ref 0–0.5)
LYMPHOCYTES # BLD AUTO: 1.16 10*3/MM3 (ref 0.6–4.8)
LYMPHOCYTES NFR BLD AUTO: 21.5 % (ref 20–45)
MCH RBC QN AUTO: 27.5 PG (ref 27–31)
MCHC RBC AUTO-ENTMCNC: 32.8 G/DL (ref 31–37)
MCV RBC AUTO: 83.9 FL (ref 81–99)
MONOCYTES # BLD AUTO: 0.38 10*3/MM3 (ref 0–1)
MONOCYTES NFR BLD AUTO: 7.1 % (ref 3–8)
NEUTROPHILS # BLD AUTO: 3.75 10*3/MM3 (ref 1.5–8.3)
NEUTROPHILS NFR BLD AUTO: 69.5 % (ref 45–70)
NRBC BLD MANUAL-RTO: 0 /100 WBC (ref 0–0)
PLATELET # BLD AUTO: 296 10*3/MM3 (ref 140–500)
PMV BLD AUTO: 8.6 FL (ref 7.4–10.4)
POTASSIUM BLD-SCNC: 3.1 MMOL/L (ref 3.5–5.2)
RBC # BLD AUTO: 3.78 10*6/MM3 (ref 4.2–5.4)
SODIUM BLD-SCNC: 142 MMOL/L (ref 136–145)
WBC NRBC COR # BLD: 5.39 10*3/MM3 (ref 4.8–10.8)

## 2017-06-05 PROCEDURE — 85025 COMPLETE CBC W/AUTO DIFF WBC: CPT | Performed by: INTERNAL MEDICINE

## 2017-06-05 PROCEDURE — 80048 BASIC METABOLIC PNL TOTAL CA: CPT | Performed by: INTERNAL MEDICINE

## 2017-06-08 ENCOUNTER — TELEPHONE (OUTPATIENT)
Dept: SURGERY | Facility: CLINIC | Age: 47
End: 2017-06-08

## 2017-06-08 NOTE — TELEPHONE ENCOUNTER
They need to contact Dr.Khalid Fonseca's office (Infectious disease) - he is the one who ordered the IV antibiotics

## 2017-06-21 ENCOUNTER — OFFICE VISIT (OUTPATIENT)
Dept: SURGERY | Facility: CLINIC | Age: 47
End: 2017-06-21

## 2017-06-21 VITALS
DIASTOLIC BLOOD PRESSURE: 80 MMHG | BODY MASS INDEX: 48.45 KG/M2 | SYSTOLIC BLOOD PRESSURE: 124 MMHG | HEIGHT: 60 IN | WEIGHT: 246.8 LBS

## 2017-06-21 DIAGNOSIS — E66.01 MORBID OBESITY, UNSPECIFIED OBESITY TYPE (HCC): ICD-10-CM

## 2017-06-21 DIAGNOSIS — Z09 SURGERY FOLLOW-UP: Primary | ICD-10-CM

## 2017-06-21 LAB
MYCOBACTERIUM SPEC CULT: NORMAL
NIGHT BLUE STAIN TISS: NORMAL

## 2017-06-21 PROCEDURE — 99024 POSTOP FOLLOW-UP VISIT: CPT | Performed by: SURGERY

## 2017-06-21 NOTE — PROGRESS NOTES
PATIENT INFORMATION  Alyssa Umana   - 1970    CHIEF COMPLAINT  Chief Complaint   Patient presents with   • Post-op     8 WK S/P COMP SEPARATION       HISTORY OF PRESENT ILLNESS  HPI  Patient was as the office today for routine follow-up visit.  She reports she is doing very well.  A PICC line was removed and IV antibiotics were completed as per infectious diseases orders.  She denies any fevers chills redness or drainage from her surgical scars.  She reports her appetite is fair.  She is tolerating regular diet.  Denies any nausea vomiting.  She reports her bowels are moving regularly and denies any melena or hematochezia.  She does complain of some right-sided abdominal tenderness.  She also has noticed a little area which she describes skin breakdown along the inferior aspect of her abdominal scar.  She denies any drainage.  She has resumed normal household activities.    REVIEW OF SYSTEMS  Review of Systems  As per history of present illness    ACTIVE PROBLEMS  Patient Active Problem List    Diagnosis   • Small bowel obstruction [K56.69]   • Incisional hernia, without obstruction or gangrene [K43.2]   • Colon cancer screening [Z12.11]   • Family history of colon cancer requiring screening colonoscopy [Z80.0]   • Family history of polyps in the colon [Z83.71]   • Plantar fasciitis [M72.2]   • Thoracic back pain [M54.6]   • Hypertension [I10]   • Cutaneous fungal infection [B36.9]   • Adiposity [E66.9]         PAST MEDICAL HISTORY  Past Medical History:   Diagnosis Date   • Diabetes mellitus     MEDICALLY INDUCED   • Family history of breast cancer in mother     age 68   • Family history of breast cancer in sister     age 40   • Family history of colon cancer     Father age 69, Brother  age 62   • History of transfusion     2 yrs ago   • Hypertension     d/t antibiotics w/pneumonia, no meds since   • Migraine    • Pneumonia    • Surgical menopause 2015   • Ventral hernia     sched surgery          SURGICAL HISTORY  Past Surgical History:   Procedure Laterality Date   • ANAL FISSURECTOMY     • BILATERAL SALPINGO OOPHORECTOMY  2015    Dr Fuentes Beebe   •  SECTION     •  SECTION PRIMARY     • CHOLECYSTECTOMY     • COLONOSCOPY      Dr. Farley   • COLONOSCOPY N/A 2017    Procedure: COLONOSCOPY;  Surgeon: Leslie Majano MD;  Location: [facility];  Service:    • DILATATION AND CURETTAGE      Dr Boles for bleeding   • EXPLORATORY LAPAROTOMY  2015    BSO and removal of 12 cm peritoneal inclusion cyst with clear fluid Dr Selwyn Beebe [facility]   • TOTAL ABDOMINAL HYSTERECTOMY  2004    fibroids,anemia, post op wound infection ([place])   • VENTRAL HERNIA REPAIR N/A 2017    Procedure: VENTRAL / INCISIONAL HERNIA WITH incarcerated incisional hernia repair, COMPONENT SEPARATION, advancement of bilateral myofascial advancement flaps, lysis of adhesions;  Surgeon: Leslie Majano MD;  Location: [facility];  Service:          FAMILY HISTORY  Family History   Problem Relation Age of Onset   • Hypertension Mother    • Breast cancer Mother 68   • Colon cancer Father 69   • Breast cancer Sister 40   • Colon cancer Brother 62   • No Known Problems Son    • No Known Problems Daughter          SOCIAL HISTORY  Social History     Occupational History   •  [employer]     Social History Main Topics   • Smoking status: Never Smoker   • Smokeless tobacco: Never Used   • Alcohol use Yes      Comment: rarely   • Drug use: No   • Sexual activity: Yes     Partners: Male     Birth control/ protection: None         CURRENT MEDICATIONS    Current Outpatient Prescriptions:   •  ALPRAZolam (XANAX) 0.25 MG tablet, Take 0.25 mg by mouth 3 (Three) Times a Day As Needed for Anxiety., Disp: , Rfl:   •  amitriptyline (ELAVIL) 100 MG tablet, Take 100 mg by mouth Every Night., Disp: , Rfl:   •  butalbital-acetaminophen-caffeine (ORBIVAN) -40 MG capsule capsule, TAKE ONE  "CAPSULE BY MOUTH EVERY 4 TO 6 HOURS AS NEEDED MAX OF 6 IN 24 HOURS, Disp: , Rfl: 0  •  Esomeprazole Magnesium (NEXIUM 24HR) 20 MG tablet delayed-release, Take 20 mg by mouth Daily., Disp: 30 tablet, Rfl: 0  •  ondansetron ODT (ZOFRAN-ODT) 4 MG disintegrating tablet, Take 1 tablet by mouth Every 6 (Six) Hours As Needed for Nausea or Vomiting., Disp: 20 tablet, Rfl: 0  •  oxyCODONE-acetaminophen (PERCOCET) 7.5-325 MG per tablet, Take 1 tablet by mouth Every 6 (Six) Hours As Needed for Moderate Pain (4-6) or Severe Pain (7-10) (Pain)., Disp: 30 tablet, Rfl: 0  •  senna (SENOKOT) 8.6 MG tablet, Take 2 tablets by mouth Daily., Disp: 60 tablet, Rfl: 0  •  sertraline (ZOLOFT) 100 MG tablet, TAKE 1 TABLET BY MOUTH IN THE MORNING prn, Disp: , Rfl: 2    ALLERGIES  Penicillins and Naproxen    VITALS  Vitals:    06/21/17 1115   BP: 124/80   Weight: 246 lb 12.8 oz (112 kg)   Height: 60\" (152.4 cm)       LAST RESULTS   Lab Requisition on 06/05/2017   Component Date Value Ref Range Status   • Glucose 06/05/2017 77  65 - 99 mg/dL Final   • BUN 06/05/2017 8  6 - 20 mg/dL Final   • Creatinine 06/05/2017 0.69  0.57 - 1.00 mg/dL Final   • Sodium 06/05/2017 142  136 - 145 mmol/L Final   • Potassium 06/05/2017 3.1* 3.5 - 5.2 mmol/L Final   • Chloride 06/05/2017 100  98 - 107 mmol/L Final   • CO2 06/05/2017 25.2  22.0 - 29.0 mmol/L Final   • Calcium 06/05/2017 8.5* 8.6 - 10.5 mg/dL Final   • eGFR  African Amer 06/05/2017 110  >60 mL/min/1.73 Final   • BUN/Creatinine Ratio 06/05/2017 11.6  7.0 - 25.0 Final   • Anion Gap 06/05/2017 16.8  mmol/L Final   • WBC 06/05/2017 5.39  4.80 - 10.80 10*3/mm3 Final   • RBC 06/05/2017 3.78* 4.20 - 5.40 10*6/mm3 Final   • Hemoglobin 06/05/2017 10.4* 12.0 - 16.0 g/dL Final   • Hematocrit 06/05/2017 31.7* 37.0 - 47.0 % Final   • MCV 06/05/2017 83.9  81.0 - 99.0 fL Final   • MCH 06/05/2017 27.5  27.0 - 31.0 pg Final   • MCHC 06/05/2017 32.8  31.0 - 37.0 g/dL Final   • RDW 06/05/2017 14.0  11.5 - 14.5 % Final "   • RDW-SD 06/05/2017 43.2  37.0 - 54.0 fl Final   • MPV 06/05/2017 8.6  7.4 - 10.4 fL Final   • Platelets 06/05/2017 296  140 - 500 10*3/mm3 Final   • Neutrophil % 06/05/2017 69.5  45.0 - 70.0 % Final   • Lymphocyte % 06/05/2017 21.5  20.0 - 45.0 % Final   • Monocyte % 06/05/2017 7.1  3.0 - 8.0 % Final   • Eosinophil % 06/05/2017 1.3  0.0 - 4.0 % Final   • Basophil % 06/05/2017 0.4  0.0 - 2.0 % Final   • Immature Grans % 06/05/2017 0.2  0.0 - 0.5 % Final   • Neutrophils, Absolute 06/05/2017 3.75  1.50 - 8.30 10*3/mm3 Final   • Lymphocytes, Absolute 06/05/2017 1.16  0.60 - 4.80 10*3/mm3 Final   • Monocytes, Absolute 06/05/2017 0.38  0.00 - 1.00 10*3/mm3 Final   • Eosinophils, Absolute 06/05/2017 0.07* 0.10 - 0.30 10*3/mm3 Final   • Basophils, Absolute 06/05/2017 0.02  0.00 - 0.20 10*3/mm3 Final   • Immature Grans, Absolute 06/05/2017 0.01  0.00 - 0.03 10*3/mm3 Final   • nRBC 06/05/2017 0.0  0.0 - 0.0 /100 WBC Final     No results found.    PHYSICAL EXAM  Physical Exam   Constitutional: She is oriented to person, place, and time. She appears well-developed and well-nourished.   Cardiovascular: Normal rate, regular rhythm and normal heart sounds.    Pulmonary/Chest: Breath sounds normal.   Abdominal:   Soft, morbidly obese, nondistended nontender.  Well-healed midline scar.  There is epithelialization in the inferior aspect there is no dehiscence or drainage.   Musculoskeletal: She exhibits no edema.   Neurological: She is alert and oriented to person, place, and time.   Nursing note and vitals reviewed.      ASSESSMENT  Status post incarcerated ventral incisional hernia repair with advancement of bilateral myofascial flap/component separation  Morbid obesity    History of multiple abdominal wall infections.    I don't see any signs of infections at this time.  I suspect some of her pain is postoperative still and discussed with the patient.  I advised her that she could use over-the-counter anti-inflammatory  medication if needed for pain.    PLAN  Follow-up one month.  Patient was advised to call the office sooner should she worsening symptoms or go to the nearest emergency room.

## 2017-07-19 ENCOUNTER — OFFICE VISIT (OUTPATIENT)
Dept: SURGERY | Facility: CLINIC | Age: 47
End: 2017-07-19

## 2017-07-19 VITALS
DIASTOLIC BLOOD PRESSURE: 72 MMHG | WEIGHT: 241 LBS | HEIGHT: 60 IN | SYSTOLIC BLOOD PRESSURE: 128 MMHG | BODY MASS INDEX: 47.32 KG/M2

## 2017-07-19 DIAGNOSIS — Z09 SURGERY FOLLOW-UP: ICD-10-CM

## 2017-07-19 DIAGNOSIS — Z09 FOLLOW UP: Primary | ICD-10-CM

## 2017-07-19 PROCEDURE — 99024 POSTOP FOLLOW-UP VISIT: CPT | Performed by: SURGERY

## 2017-07-19 RX ORDER — DOCUSATE SODIUM 100 MG/1
100 CAPSULE, LIQUID FILLED ORAL 2 TIMES DAILY PRN
Qty: 60 CAPSULE | Refills: 1 | Status: SHIPPED | OUTPATIENT
Start: 2017-07-19 | End: 2017-08-18

## 2017-07-19 NOTE — PROGRESS NOTES
PATIENT INFORMATION  Alyssa Umana   - 1970    CHIEF COMPLAINT  Chief Complaint   Patient presents with   • Follow-up       HISTORY OF PRESENT ILLNESS  HPI  Patient presents today to the office for routine follow-up.  This is her one month follow-up visit.  She reports she is overall doing well except that she recently had a upper respiratory tract infection and while she was coughing she noticed sharp pain along the left side of the abdomen.  This is at the previous drain site.  She denies any nausea, vomiting, fevers or chills or feeling a bulge or mass.  She reports she is now having problems with constipation.  She does have a history of chronic constipation.  She has been using stool softeners but ran out of the Colace and would like to have that prescription refilled.  She has resumed normal household activities and is actually working on a exercise and diet program and has lost some weight since her last office visit with me.  She denies any melena or hematochezia.      REVIEW OF SYSTEMS  Review of Systems  As per history of present illness    ACTIVE PROBLEMS  Patient Active Problem List    Diagnosis   • Small bowel obstruction [K56.69]   • Incisional hernia, without obstruction or gangrene [K43.2]   • Colon cancer screening [Z12.11]   • Family history of colon cancer requiring screening colonoscopy [Z80.0]   • Family history of polyps in the colon [Z83.71]   • Plantar fasciitis [M72.2]   • Thoracic back pain [M54.6]   • Hypertension [I10]   • Cutaneous fungal infection [B36.9]   • Adiposity [E66.9]         PAST MEDICAL HISTORY  Past Medical History:   Diagnosis Date   • Diabetes mellitus     MEDICALLY INDUCED   • Family history of breast cancer in mother     age 68   • Family history of breast cancer in sister     age 40   • Family history of colon cancer     Father age 69, Brother  age 62   • History of transfusion     2 yrs ago   • Hypertension     d/t antibiotics w/pneumonia, no meds since    • Migraine    • Pneumonia    • Surgical menopause 2015   • Ventral hernia     sched surgery         SURGICAL HISTORY  Past Surgical History:   Procedure Laterality Date   • ANAL FISSURECTOMY     • BILATERAL SALPINGO OOPHORECTOMY  2015    Dr Fuentes Beebe   •  SECTION     •  SECTION PRIMARY     • CHOLECYSTECTOMY     • COLONOSCOPY      Dr. Farley   • COLONOSCOPY N/A 2017    Procedure: COLONOSCOPY;  Surgeon: Leslie Majano MD;  Location: [facility] OR;  Service:    • DILATATION AND CURETTAGE      Dr Boles for bleeding   • EXPLORATORY LAPAROTOMY  2015    BSO and removal of 12 cm peritoneal inclusion cyst with clear fluid Dr Selwyn Beebe [facility]   • TOTAL ABDOMINAL HYSTERECTOMY  2004    fibroids,anemia, post op wound infection ([place])   • VENTRAL HERNIA REPAIR N/A 2017    Procedure: VENTRAL / INCISIONAL HERNIA WITH incarcerated incisional hernia repair, COMPONENT SEPARATION, advancement of bilateral myofascial advancement flaps, lysis of adhesions;  Surgeon: Leslie Majano MD;  Location: [facility] OR;  Service:          FAMILY HISTORY  Family History   Problem Relation Age of Onset   • Hypertension Mother    • Breast cancer Mother 68   • Colon cancer Father 69   • Breast cancer Sister 40   • Colon cancer Brother 62   • No Known Problems Son    • No Known Problems Daughter          SOCIAL HISTORY  Social History     Occupational History   •  [employer]     Social History Main Topics   • Smoking status: Never Smoker   • Smokeless tobacco: Never Used   • Alcohol use Yes      Comment: rarely   • Drug use: No   • Sexual activity: Yes     Partners: Male     Birth control/ protection: None         CURRENT MEDICATIONS    Current Outpatient Prescriptions:   •  ALPRAZolam (XANAX) 0.25 MG tablet, Take 0.25 mg by mouth 3 (Three) Times a Day As Needed for Anxiety., Disp: , Rfl:   •  amitriptyline (ELAVIL) 100 MG tablet, Take 100 mg by mouth Every  "Night., Disp: , Rfl:   •  butalbital-acetaminophen-caffeine (ORBIVAN) -40 MG capsule capsule, TAKE ONE CAPSULE BY MOUTH EVERY 4 TO 6 HOURS AS NEEDED MAX OF 6 IN 24 HOURS, Disp: , Rfl: 0  •  docusate sodium (COLACE) 100 MG capsule, Take 1 capsule by mouth 2 (Two) Times a Day As Needed for Constipation for up to 30 days., Disp: 60 capsule, Rfl: 1  •  Esomeprazole Magnesium (NEXIUM 24HR) 20 MG tablet delayed-release, Take 20 mg by mouth Daily., Disp: 30 tablet, Rfl: 0  •  ondansetron ODT (ZOFRAN-ODT) 4 MG disintegrating tablet, Take 1 tablet by mouth Every 6 (Six) Hours As Needed for Nausea or Vomiting., Disp: 20 tablet, Rfl: 0  •  oxyCODONE-acetaminophen (PERCOCET) 7.5-325 MG per tablet, Take 1 tablet by mouth Every 6 (Six) Hours As Needed for Moderate Pain (4-6) or Severe Pain (7-10) (Pain)., Disp: 30 tablet, Rfl: 0  •  senna (SENOKOT) 8.6 MG tablet, Take 2 tablets by mouth Daily., Disp: 60 tablet, Rfl: 0  •  sertraline (ZOLOFT) 100 MG tablet, TAKE 1 TABLET BY MOUTH IN THE MORNING prn, Disp: , Rfl: 2    ALLERGIES  Penicillins and Naproxen    VITALS  Vitals:    07/19/17 1109   BP: 128/72   Weight: 241 lb (109 kg)   Height: 60\" (152.4 cm)       LAST RESULTS   Lab Requisition on 06/05/2017   Component Date Value Ref Range Status   • Glucose 06/05/2017 77  65 - 99 mg/dL Final   • BUN 06/05/2017 8  6 - 20 mg/dL Final   • Creatinine 06/05/2017 0.69  0.57 - 1.00 mg/dL Final   • Sodium 06/05/2017 142  136 - 145 mmol/L Final   • Potassium 06/05/2017 3.1* 3.5 - 5.2 mmol/L Final   • Chloride 06/05/2017 100  98 - 107 mmol/L Final   • CO2 06/05/2017 25.2  22.0 - 29.0 mmol/L Final   • Calcium 06/05/2017 8.5* 8.6 - 10.5 mg/dL Final   • eGFR  African Amer 06/05/2017 110  >60 mL/min/1.73 Final   • BUN/Creatinine Ratio 06/05/2017 11.6  7.0 - 25.0 Final   • Anion Gap 06/05/2017 16.8  mmol/L Final   • WBC 06/05/2017 5.39  4.80 - 10.80 10*3/mm3 Final   • RBC 06/05/2017 3.78* 4.20 - 5.40 10*6/mm3 Final   • Hemoglobin 06/05/2017 " 10.4* 12.0 - 16.0 g/dL Final   • Hematocrit 06/05/2017 31.7* 37.0 - 47.0 % Final   • MCV 06/05/2017 83.9  81.0 - 99.0 fL Final   • MCH 06/05/2017 27.5  27.0 - 31.0 pg Final   • MCHC 06/05/2017 32.8  31.0 - 37.0 g/dL Final   • RDW 06/05/2017 14.0  11.5 - 14.5 % Final   • RDW-SD 06/05/2017 43.2  37.0 - 54.0 fl Final   • MPV 06/05/2017 8.6  7.4 - 10.4 fL Final   • Platelets 06/05/2017 296  140 - 500 10*3/mm3 Final   • Neutrophil % 06/05/2017 69.5  45.0 - 70.0 % Final   • Lymphocyte % 06/05/2017 21.5  20.0 - 45.0 % Final   • Monocyte % 06/05/2017 7.1  3.0 - 8.0 % Final   • Eosinophil % 06/05/2017 1.3  0.0 - 4.0 % Final   • Basophil % 06/05/2017 0.4  0.0 - 2.0 % Final   • Immature Grans % 06/05/2017 0.2  0.0 - 0.5 % Final   • Neutrophils, Absolute 06/05/2017 3.75  1.50 - 8.30 10*3/mm3 Final   • Lymphocytes, Absolute 06/05/2017 1.16  0.60 - 4.80 10*3/mm3 Final   • Monocytes, Absolute 06/05/2017 0.38  0.00 - 1.00 10*3/mm3 Final   • Eosinophils, Absolute 06/05/2017 0.07* 0.10 - 0.30 10*3/mm3 Final   • Basophils, Absolute 06/05/2017 0.02  0.00 - 0.20 10*3/mm3 Final   • Immature Grans, Absolute 06/05/2017 0.01  0.00 - 0.03 10*3/mm3 Final   • nRBC 06/05/2017 0.0  0.0 - 0.0 /100 WBC Final     No results found.    PHYSICAL EXAM  Physical Exam   Constitutional: She appears well-developed and well-nourished.   Cardiovascular: Normal rate, regular rhythm and normal heart sounds.    Pulmonary/Chest: Breath sounds normal.   Abdominal:   Morbidly obese, soft, nondistended nontender positive bowel sounds in all 4 quadrants.  She does have thickening underneath her abdominal scar and at the previous drain site on the left abdomen-  I suspect this is postoperative scar tissue, there is no signs of infection.   Musculoskeletal: She exhibits no edema.   Nursing note and vitals reviewed.      ASSESSMENT    Status post incarcerated ventral incisional hernia repair with advancement of bilateral myofascial flap/component separation  Morbid  obesity     History of multiple abdominal wall infections  Presently I see no signs of infection.  I think this is all postoperative.  I have discussed this with the patient.    Chronic constipation -discussed diet modification.  High-fiber diet instruction sheet provided prescription for Colace 100 mg by mouth twice a day ×30 days with 1 refill was e- scribed.  I also gave her a list of over-the-counter fiber supplements and probiotics.  I have encouraged her to increase the water intake.    PLAN  Follow-up 2 months.  Patient was advised to call the office sooner should she have worsening symptoms or go to the nearest emergency room.

## 2017-09-20 ENCOUNTER — OFFICE VISIT (OUTPATIENT)
Dept: SURGERY | Facility: CLINIC | Age: 47
End: 2017-09-20

## 2017-09-20 VITALS
SYSTOLIC BLOOD PRESSURE: 122 MMHG | WEIGHT: 250 LBS | BODY MASS INDEX: 49.08 KG/M2 | HEIGHT: 60 IN | DIASTOLIC BLOOD PRESSURE: 74 MMHG

## 2017-09-20 DIAGNOSIS — E66.01 MORBID OBESITY WITH BMI OF 45.0-49.9, ADULT (HCC): Primary | ICD-10-CM

## 2017-09-20 PROCEDURE — 99213 OFFICE O/P EST LOW 20 MIN: CPT | Performed by: SURGERY

## 2017-09-20 RX ORDER — GABAPENTIN 300 MG/1
CAPSULE ORAL
Refills: 0 | COMMUNITY
Start: 2017-09-01

## 2017-09-20 RX ORDER — OXYCODONE AND ACETAMINOPHEN 10; 325 MG/1; MG/1
TABLET ORAL
Refills: 0 | COMMUNITY
Start: 2017-09-12

## 2017-09-20 NOTE — PROGRESS NOTES
PATIENT INFORMATION  Alyssa Umana   - 1970    CHIEF COMPLAINT  Chief Complaint   Patient presents with   • Follow-up   2 mo f/u, pt states she has intermittent pain still - Is concerned about recent weight gain    HISTORY OF PRESENT ILLNESS  HPI  Patient presents to the office today for a routine office visit.  She reports since her last office visit with me cc overall doing well.  She has pain weight recently.  She is extremely upset as she feels she is unable to lose weight despite multiple attempts i.e. diet and exercise.  She reports that occasionally she will experience intermittent pain in her scar but she thinks it is because her pannus is dragging on her scars.  She denies nausea, vomiting, chest pain, shortness of breath, fevers, chills or drainage from her surgical scars or any other constitutional symptoms.   Since her last office visit with me she also [facility]  They didn't take her off the OxyIR.  She is currently on Percocet 10 twice a day and Neurontin.  She reports she has been having constipation recently and the cystic take over-the-counter stool softeners including Dulcolax.  Discussed with patient --  narcotic-induced constipation at length.        REVIEW OF SYSTEMS  Review of Systems   Constitutional: Positive for unexpected weight change.   Respiratory: Negative.    Cardiovascular: Negative.    Gastrointestinal: Negative.    Musculoskeletal: Positive for back pain.         ACTIVE PROBLEMS  Patient Active Problem List    Diagnosis   • Small bowel obstruction [K56.69]   • Incisional hernia, without obstruction or gangrene [K43.2]   • Colon cancer screening [Z12.11]   • Family history of colon cancer requiring screening colonoscopy [Z80.0]   • Family history of polyps in the colon [Z83.71]   • Plantar fasciitis [M72.2]   • Thoracic back pain [M54.6]   • Hypertension [I10]   • Cutaneous fungal infection [B36.9]   • Adiposity [E66.9]          PAST MEDICAL HISTORY  Past Medical History:   Diagnosis Date   • Diabetes mellitus     MEDICALLY INDUCED   • Family history of breast cancer in mother     age 68   • Family history of breast cancer in sister     age 40   • Family history of colon cancer     Father age 69, Brother  age 62   • History of transfusion     2 yrs ago   • Hypertension     d/t antibiotics w/pneumonia, no meds since   • Migraine    • Pneumonia    • Surgical menopause 2015   • Ventral hernia     sched surgery         SURGICAL HISTORY  Past Surgical History:   Procedure Laterality Date   • ANAL FISSURECTOMY     • BILATERAL SALPINGO OOPHORECTOMY  2015    Dr Fuentes Beebe   •  SECTION     •  SECTION PRIMARY     • CHOLECYSTECTOMY     • COLONOSCOPY      Dr. Farley   • COLONOSCOPY N/A 2017    Procedure: COLONOSCOPY;  Surgeon: Leslie Majano MD;  Location: [facility];  Service:    • DILATATION AND CURETTAGE  1989    Dr Boles for bleeding   • EXPLORATORY LAPAROTOMY  2015    BSO and removal of 12 cm peritoneal inclusion cyst with clear fluid Dr Selwyn Beebe [facility]   • TOTAL ABDOMINAL HYSTERECTOMY  2004    fibroids,anemia, post op wound infection ([place])   • VENTRAL HERNIA REPAIR N/A 2017    Procedure: VENTRAL / INCISIONAL HERNIA WITH incarcerated incisional hernia repair, COMPONENT SEPARATION, advancement of bilateral myofascial advancement flaps, lysis of adhesions;  Surgeon: Leslie Majano MD;  Location: [facility];  Service:          FAMILY HISTORY  Family History   Problem Relation Age of Onset   • Hypertension Mother    • Breast cancer Mother 68   • Colon cancer Father 69   • Breast cancer Sister 40   • Colon cancer Brother 62   • No Known Problems Son    • No Known Problems Daughter          SOCIAL HISTORY  Social History     Occupational History   •  [employer]     Social History Main Topics   • Smoking status: Never Smoker   • Smokeless tobacco:  "Never Used   • Alcohol use Yes      Comment: rarely   • Drug use: No   • Sexual activity: Yes     Partners: Male     Birth control/ protection: None         CURRENT MEDICATIONS    Current Outpatient Prescriptions:   •  ALPRAZolam (XANAX) 0.25 MG tablet, Take 0.25 mg by mouth 3 (Three) Times a Day As Needed for Anxiety., Disp: , Rfl:   •  amitriptyline (ELAVIL) 100 MG tablet, Take 100 mg by mouth Every Night., Disp: , Rfl:   •  butalbital-acetaminophen-caffeine (ORBIVAN) -40 MG capsule capsule, TAKE ONE CAPSULE BY MOUTH EVERY 4 TO 6 HOURS AS NEEDED MAX OF 6 IN 24 HOURS, Disp: , Rfl: 0  •  Esomeprazole Magnesium (NEXIUM 24HR) 20 MG tablet delayed-release, Take 20 mg by mouth Daily., Disp: 30 tablet, Rfl: 0  •  gabapentin (NEURONTIN) 300 MG capsule, TAKE 1 CAPSULE THREE TIMES A DAY, Disp: , Rfl: 0  •  ondansetron ODT (ZOFRAN-ODT) 4 MG disintegrating tablet, Take 1 tablet by mouth Every 6 (Six) Hours As Needed for Nausea or Vomiting., Disp: 20 tablet, Rfl: 0  •  oxyCODONE-acetaminophen (PERCOCET)  MG per tablet, TAKE ONE TABLET TWICE DAILY FOR PAIN, Disp: , Rfl: 0  •  senna (SENOKOT) 8.6 MG tablet, Take 2 tablets by mouth Daily., Disp: 60 tablet, Rfl: 0  •  sertraline (ZOLOFT) 100 MG tablet, TAKE 1 TABLET BY MOUTH IN THE MORNING prn, Disp: , Rfl: 2    ALLERGIES  Penicillins and Naproxen    VITALS  Vitals:    09/20/17 1106   BP: 122/74   Weight: 250 lb (113 kg)   Height: 60\" (152.4 cm)       LAST RESULTS   Lab Requisition on 06/05/2017   Component Date Value Ref Range Status   • Glucose 06/05/2017 77  65 - 99 mg/dL Final   • BUN 06/05/2017 8  6 - 20 mg/dL Final   • Creatinine 06/05/2017 0.69  0.57 - 1.00 mg/dL Final   • Sodium 06/05/2017 142  136 - 145 mmol/L Final   • Potassium 06/05/2017 3.1* 3.5 - 5.2 mmol/L Final   • Chloride 06/05/2017 100  98 - 107 mmol/L Final   • CO2 06/05/2017 25.2  22.0 - 29.0 mmol/L Final   • Calcium 06/05/2017 8.5* 8.6 - 10.5 mg/dL Final   • eGFR  African Amer 06/05/2017 110  >60 " mL/min/1.73 Final   • BUN/Creatinine Ratio 06/05/2017 11.6  7.0 - 25.0 Final   • Anion Gap 06/05/2017 16.8  mmol/L Final   • WBC 06/05/2017 5.39  4.80 - 10.80 10*3/mm3 Final   • RBC 06/05/2017 3.78* 4.20 - 5.40 10*6/mm3 Final   • Hemoglobin 06/05/2017 10.4* 12.0 - 16.0 g/dL Final   • Hematocrit 06/05/2017 31.7* 37.0 - 47.0 % Final   • MCV 06/05/2017 83.9  81.0 - 99.0 fL Final   • MCH 06/05/2017 27.5  27.0 - 31.0 pg Final   • MCHC 06/05/2017 32.8  31.0 - 37.0 g/dL Final   • RDW 06/05/2017 14.0  11.5 - 14.5 % Final   • RDW-SD 06/05/2017 43.2  37.0 - 54.0 fl Final   • MPV 06/05/2017 8.6  7.4 - 10.4 fL Final   • Platelets 06/05/2017 296  140 - 500 10*3/mm3 Final   • Neutrophil % 06/05/2017 69.5  45.0 - 70.0 % Final   • Lymphocyte % 06/05/2017 21.5  20.0 - 45.0 % Final   • Monocyte % 06/05/2017 7.1  3.0 - 8.0 % Final   • Eosinophil % 06/05/2017 1.3  0.0 - 4.0 % Final   • Basophil % 06/05/2017 0.4  0.0 - 2.0 % Final   • Immature Grans % 06/05/2017 0.2  0.0 - 0.5 % Final   • Neutrophils, Absolute 06/05/2017 3.75  1.50 - 8.30 10*3/mm3 Final   • Lymphocytes, Absolute 06/05/2017 1.16  0.60 - 4.80 10*3/mm3 Final   • Monocytes, Absolute 06/05/2017 0.38  0.00 - 1.00 10*3/mm3 Final   • Eosinophils, Absolute 06/05/2017 0.07* 0.10 - 0.30 10*3/mm3 Final   • Basophils, Absolute 06/05/2017 0.02  0.00 - 0.20 10*3/mm3 Final   • Immature Grans, Absolute 06/05/2017 0.01  0.00 - 0.03 10*3/mm3 Final   • nRBC 06/05/2017 0.0  0.0 - 0.0 /100 WBC Final     No results found.    PHYSICAL EXAM  Physical Exam   Constitutional: She is oriented to person, place, and time. She appears well-developed and well-nourished.   HENT:   Head: Normocephalic and atraumatic.   Eyes: No scleral icterus.   Neck: Normal range of motion. Neck supple.   Cardiovascular: Normal rate, regular rhythm and normal heart sounds.    Pulmonary/Chest: Breath sounds normal.   Abdominal:   Soft, morbidly obese, nondistended, nontender.  Very large pannus.  Well-healed scars.  I  do not appreciate a recurrent incisional hernia.   Musculoskeletal: She exhibits no edema.   Neurological: She is alert and oriented to person, place, and time.   Nursing note and vitals reviewed.      ASSESSMENT  Status post incarcerated ventral incisional hernia repair with advancement of bilateral myofascial flap/component separation - wounds well-healed.  Doing well from a general surgery standpoint.  I do not appreciate a recurrence at this time.    Morbid obesity  BMI 48.8  With recent increase in weight  Discussed weight loss options with the patient.  She is requesting a referral to bariatric surgery.  We'll refer to Dr. Epifanio Mar.    Chronic constipation   Chronic narcotic use-discussed diet modification.  High-fiber diet instruction sheet provided prescription for Colace 100 mg by mouth twice a day ×30 days with 1 refill was faxed to her pharmacy.  I also gave her a list of over-the-counter fiber supplements and probiotics.  I also recommended to her to take MiraLAX or Metamucil in 8 ounces of water/orange of steal coffee every morning and increase her water intake.     As far as repeat colonoscopy is concerned she will need one in 1 year with a 2 day prep-will make sure she is on recall list.    PLAN  Will place patient on recall list for repeat colonoscopy in April 2018 with a 2 day prep.  Otherwise she can follow up as needed/when necessary.  Patient was advised to call the office sooner should she have  worsening symptoms or go to the nearest emergency room.

## 2018-01-31 ENCOUNTER — TRANSCRIBE ORDERS (OUTPATIENT)
Dept: ADMINISTRATIVE | Facility: HOSPITAL | Age: 48
End: 2018-01-31

## 2018-01-31 DIAGNOSIS — M50.30 DDD (DEGENERATIVE DISC DISEASE), CERVICAL: Primary | ICD-10-CM

## 2018-01-31 DIAGNOSIS — M51.36 DDD (DEGENERATIVE DISC DISEASE), LUMBAR: ICD-10-CM

## 2018-02-08 ENCOUNTER — APPOINTMENT (OUTPATIENT)
Dept: MRI IMAGING | Facility: HOSPITAL | Age: 48
End: 2018-02-08

## 2023-03-13 ENCOUNTER — LAB REQUISITION (OUTPATIENT)
Dept: LAB | Facility: HOSPITAL | Age: 53
End: 2023-03-13

## 2023-03-13 DIAGNOSIS — N20.1 CALCULUS OF URETER: ICD-10-CM

## 2023-03-13 PROCEDURE — 82365 CALCULUS SPECTROSCOPY: CPT | Performed by: UROLOGY

## 2023-03-16 LAB
CALCIUM OXALATE DIHYDRATE MFR STONE IR: 30 %
COLOR STONE: NORMAL
COM MFR STONE: 70 %
COMPN STONE: NORMAL
LABORATORY COMMENT REPORT: NORMAL
Lab: NORMAL
Lab: NORMAL
PHOTO: NORMAL
SIZE STONE: NORMAL MM
SPEC SOURCE SUBJ: NORMAL
WT STONE: 30 MG

## (undated) DEVICE — SUT SILK 3/0 SH CR8 18IN C013D

## (undated) DEVICE — SPNG LAP 18X18IN LF STRL PK/5

## (undated) DEVICE — BNDR ABD 4PANEL12IN 30TO45IN

## (undated) DEVICE — TOTAL TRAY, DB, 100% SILI FOLEY, 16FR 10: Brand: MEDLINE

## (undated) DEVICE — Device: Brand: LIGHT GUARD™ FLEXIBLE LIGHT HANDLE COVER (1 EACH/PKG)

## (undated) DEVICE — ENDOGATOR HYBRID TUBING KIT FOR USE WITH ENDOGATOR IRRIGATION PUMP, OLYMPUS PUMP, GI4000 ESU, AND TORRENT IRRIGATION PUMP.: Brand: ENDOGATOR KIT

## (undated) DEVICE — PROXIMATE RH ROTATING HEAD SKIN STAPLERS (35 WIDE) CONTAINS 35 STAINLESS STEEL STAPLES: Brand: PROXIMATE

## (undated) DEVICE — Device

## (undated) DEVICE — MASK,FACE,SHIELD,BLUE,ANTI FOG,TIES: Brand: MEDLINE

## (undated) DEVICE — GLV SURG SENSICARE W/ALOE PF LF 6.5 STRL

## (undated) DEVICE — ENDOGATOR AUXILIARY WATER JET CONNECTOR: Brand: ENDOGATOR

## (undated) DEVICE — DRP WARMR MACH

## (undated) DEVICE — SYR LUER SLPTP 50ML

## (undated) DEVICE — SUT ETHLN 2/0 FS 18IN 664H

## (undated) DEVICE — GOWN ISOL W/THUMB UNIV BLU BX/15

## (undated) DEVICE — SPNG GZ WOVN 4X4IN 12PLY 10/BX STRL

## (undated) DEVICE — BNDR ABD 4PANEL 12IN 46 TO 62IN

## (undated) DEVICE — SUT PROLN 1 CTX 30IN 8455H

## (undated) DEVICE — ULTRACLEAN ACCESSORY ELECTRODE 1" (2.54 CM) COATED BLADE: Brand: ULTRACLEAN

## (undated) DEVICE — DRP Z/FRICTION 10X16IN

## (undated) DEVICE — SUT SILK 2/0 LIGAPAK LA55G

## (undated) DEVICE — SUT VIC 3/0 CTI 36IN J944H

## (undated) DEVICE — SUT SILK 2/0 SH 30IN K833H

## (undated) DEVICE — SUCTION CANISTER, 3000CC,SAFELINER: Brand: DEROYAL

## (undated) DEVICE — SPONGE,DRAIN,NONWVN,4"X4",6PLY,STRL,LF: Brand: MEDLINE

## (undated) DEVICE — RESERVOIR,SUCTION,100CC,SILICONE: Brand: MEDLINE

## (undated) DEVICE — SYR LL 3CC

## (undated) DEVICE — PK PROC MAJ 90

## (undated) DEVICE — DRSNG PAD ABD 8X10IN STRL

## (undated) DEVICE — Device: Brand: DEFENDO AIR/WATER/SUCTION AND BIOPSY VALVE

## (undated) DEVICE — MEDIUM VISCERA RETAINER: Brand: VISCERA RETAINER, FISH

## (undated) DEVICE — ST TISSOMAT SPRY

## (undated) DEVICE — BW-412T DISP COMBO CLEANING BRUSH: Brand: SINGLE USE COMBINATION CLEANING BRUSH

## (undated) DEVICE — APPL CHLORAPREP W/TINT 26ML ORNG